# Patient Record
Sex: MALE | Race: BLACK OR AFRICAN AMERICAN | Employment: STUDENT | URBAN - METROPOLITAN AREA
[De-identification: names, ages, dates, MRNs, and addresses within clinical notes are randomized per-mention and may not be internally consistent; named-entity substitution may affect disease eponyms.]

---

## 2018-02-05 ENCOUNTER — TELEPHONE (OUTPATIENT)
Dept: FAMILY MEDICINE | Facility: CLINIC | Age: 23
End: 2018-02-05

## 2018-02-05 ENCOUNTER — OFFICE VISIT (OUTPATIENT)
Dept: FAMILY MEDICINE | Facility: CLINIC | Age: 23
End: 2018-02-05
Payer: COMMERCIAL

## 2018-02-05 DIAGNOSIS — L70.0 ACNE VULGARIS: Primary | ICD-10-CM

## 2018-02-05 DIAGNOSIS — Z79.899 ENCOUNTER FOR LONG-TERM (CURRENT) USE OF HIGH-RISK MEDICATION: ICD-10-CM

## 2018-02-05 DIAGNOSIS — Z79.899 LONG TERM USE OF ISOTRETINOIN: ICD-10-CM

## 2018-02-05 LAB
AST SERPL W P-5'-P-CCNC: 27 U/L (ref 0–45)
BASOPHILS # BLD AUTO: 0 10E9/L (ref 0–0.2)
BASOPHILS NFR BLD AUTO: 0.3 %
CHOLEST SERPL-MCNC: 157 MG/DL
DIFFERENTIAL METHOD BLD: NORMAL
EOSINOPHIL # BLD AUTO: 0.1 10E9/L (ref 0–0.7)
EOSINOPHIL NFR BLD AUTO: 1.4 %
ERYTHROCYTE [DISTWIDTH] IN BLOOD BY AUTOMATED COUNT: 12.1 % (ref 10–15)
HCT VFR BLD AUTO: 42.5 % (ref 40–53)
HDLC SERPL-MCNC: 62 MG/DL
HGB BLD-MCNC: 14.4 G/DL (ref 13.3–17.7)
LDLC SERPL CALC-MCNC: 81 MG/DL
LYMPHOCYTES # BLD AUTO: 2.5 10E9/L (ref 0.8–5.3)
LYMPHOCYTES NFR BLD AUTO: 35.4 %
MCH RBC QN AUTO: 29.6 PG (ref 26.5–33)
MCHC RBC AUTO-ENTMCNC: 33.9 G/DL (ref 31.5–36.5)
MCV RBC AUTO: 87 FL (ref 78–100)
MONOCYTES # BLD AUTO: 0.8 10E9/L (ref 0–1.3)
MONOCYTES NFR BLD AUTO: 11 %
NEUTROPHILS # BLD AUTO: 3.7 10E9/L (ref 1.6–8.3)
NEUTROPHILS NFR BLD AUTO: 51.9 %
NONHDLC SERPL-MCNC: 95 MG/DL
PLATELET # BLD AUTO: 163 10E9/L (ref 150–450)
RBC # BLD AUTO: 4.86 10E12/L (ref 4.4–5.9)
TRIGL SERPL-MCNC: 72 MG/DL
WBC # BLD AUTO: 7 10E9/L (ref 4–11)

## 2018-02-05 PROCEDURE — 80061 LIPID PANEL: CPT | Performed by: FAMILY MEDICINE

## 2018-02-05 PROCEDURE — 99214 OFFICE O/P EST MOD 30 MIN: CPT | Performed by: FAMILY MEDICINE

## 2018-02-05 PROCEDURE — 84450 TRANSFERASE (AST) (SGOT): CPT | Performed by: FAMILY MEDICINE

## 2018-02-05 PROCEDURE — 85025 COMPLETE CBC W/AUTO DIFF WBC: CPT | Performed by: FAMILY MEDICINE

## 2018-02-05 PROCEDURE — 36415 COLL VENOUS BLD VENIPUNCTURE: CPT | Performed by: FAMILY MEDICINE

## 2018-02-05 RX ORDER — ISOTRETINOIN 20 MG/1
20 CAPSULE ORAL 2 TIMES DAILY
Qty: 60 CAPSULE | Refills: 0 | Status: SHIPPED | OUTPATIENT
Start: 2018-02-05 | End: 2018-03-07

## 2018-02-05 NOTE — PROGRESS NOTES
[facility] - PRIMARY CARE SKIN    CC : Acne   SUBJECTIVE:                                                    Juan C Allen is a 22 year old male who presents to clinic today to start his first month of oral isotretinoin therapy for severe, recalcitrant acne.     he has tried the following without success : oral antibiotics, benzyl peroxide, topical tretinoins, topical antibiotics and OTC.  Oral isotretinoin has previously been very effective for control of acne.    Family history of acne : YES - brother's acne has been effectively controlled with oral isotretinoin.    Symptoms have been ongoing for : years.  The acne is primarily located on the : neck, back, chest and face.  Acne generally presents as : pimple(s).    Current treatment : Water only for cleansing.  Response to treatment : Acne control has diminished since last oral isotretinoin course. It has begun to recur over the last 1 year. He was unable to return to clinic because of a loss of insurance.  Side effects noted : None.    Previous treatments include :   Oral isotretinoin (however, courses of oral isotretinoin with 7-8 week gaps in patient follow-up have been discontinued due to patient non-compliance with follow-up). He previously had insurance issues.    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    Refer to electronic medical record (EMR) for past medical history and medications.    INTEGUMENTARY/SKIN: POSITIVE for acne  ROS : 14 point review of systems was negative except the symptoms listed above in the HPI.    This document serves as a record of the services and decisions personally performed and made by Glo Montoya MD. It was created on her behalf by Nico Alfred, a trained medical scribe.  The creation of this document is based on the scribe's personal observations and the provider's statements to the medical scribe.  Nico Alfred, February 5, 2018 8:17 AM      OBJECTIVE:                                                       Wt Readings from Last 2 Encounters:   06/25/16 135 lb (61.2 kg)   11/20/15 119 lb (54 kg) (3 %)*     * Growth percentiles are based on CDC 2-20 Years data.     GENERAL: healthy, alert and no distress  SKIN: Soto Skin Type - V.  Face, Neck and Trunk were examined. The dermatoscope was used to help evaluate pigmented lesions.  Skin Pertinent Findings:  Face: Residual post-inflammatory hyperpigmentation; scattered inflammatory papules on neck and lateral sides of face.    Chest: Scattered inflammatory papules.    Back: Residual post-inflammatory hyperpigmentation.    Diagnostic Test Results:  No results found for this or any previous visit (from the past 24 hour(s)).    MDM : . Discussed pathophysiology of acne, treatment options, and expectations for treatment response.  Side effects of oral isotretinoin reviewed : dryness of the skin and mucous membranes, arthralgias, myalgias, mood changes. Discussed potential flare of the acne.      ASSESSMENT:                                                      Encounter Diagnoses   Name Primary?     Acne vulgaris Yes     Long term use of isotretinoin      Encounter for long-term (current) use of high-risk medication      Comment : severe acne, recalcitrant to previous treatments. Oral isotretinoin to be started with monthly monitoring.      PLAN:                                                      Patient Instructions   FUTURE APPOINTMENTS  Follow up in 28-31 days.    ORAL ISOTRETINOIN INSTRUCTIONS  CURRENT DOSAGE: Take by mouth one 20 mg tablet, two times a day.      Stop all other acne cleansers or creams.    Take the isotretinoin with a fatty meal (such as peanut butter or yogurt) to improve the absorption of the medication.    OFFICE VISIT INSTRUCTIONS    Schedule follow-up appointments every 28-31 days.    Bring iPledge ID# and PASSWORD with you to each office visit. Make sure you have obtained your password before the next office visit.    You will  "need to do a lab blood draw every month:    Schedule blood draw to be completed 1-2 days prior to each office visit  You may complete these at any [facility] lab; just remember to schedule it before you go.      If you are being seen elsewhere by a dermatologist for oral isotretinoin monitoring, be sure to go into iPledge for \"transfer of care\" for the appropriate physician.    Make sure to always  your medication within 3 days of prescription being sent to the pharmacy    Check with your insurance company for coverage of oral isotretinoin therapy for recalcitrant acne. Ask if they have a preferred brand of oral isotretinoin (e.g. Claravis, Myorisan, Zenatane, Amnesteem, Sotret)    RECOMMENDATIONS FOR DRYNESS    Moisturizer : Cetaphil facial moisturizer.    Nasal mist spray : Ocean brand. Use at bedtime.    Do not use clarita-synephrine.    Lips : Aquaphor ointment, Vaseline jelly, or Vanicream lip protectant for the dryness on the lips.    Eye drops : Refresh tears saline eye drops for dry eye symptoms. Consider also use of gel eye drops at bedtime if excessive eye dryness.    Due to dryness of mouth, floss daily and brush teeth at least twice daily.    Discontinue all other acne medications.    Initial lab studies were ordered.    Oral isotretinoin is discussed fully with the patient.    It is a very effective drug to treat acne vulgaris but has many significant side effects. Chief among these are teratogenesis, hepatic injury, dyslipidemia and severe drying of the mucous membranes. All of these issues have been discussed in detail. Monthly blood tests to monitor lipids and liver functions will be necessary. Expect painful dryness and/or fissuring around the lips, eyes, and other moist areas of the body. Balms may be protective. Contact lenses may be too painful to wear temporarily while on this drug. Episodes of significant depression have been reported, including suicidal ideation and attempts in rare " cases. It may also cause pseudotumor cerebri and hyperostosis. The patient will report any such changes in mood, depressive symptoms or suicidal thoughts, headaches, joint or bone pains.    Female patients MUST use two simultaneous methods of family planning. Accutane is Category X for pregnancy, meaning it will cause fetal teratogenic malformations, and pregnancy MUST be avoided while on this drug. For that reason, the patient is admonished to never share the medication.    The dose is 0.5-1 mg/kg in two divided doses for 15-20 weeks.    After discussion of these important issues, he indicates complete understanding of all of the above, and Does wish to proceed with accutane therapy. A signed consent was obtained. Discussed the importance of sticking with the program, not picking or excoriating.    Dose:  Month # : 1.  Oral Isotretinoin : 20 mg po BID.  Insurance preferred brand : ?None.    RTC in one month for follow up, or sooner prn, with laboratory studies completed.      PROCEDURES:                                                    None.    TT : 20 minutes.  CT : 15 minutes, with 50% of time spent reviewing lab work and counseling patient about side effects, benefits and risks of oral isotretinoin.      The information in this document, created by the medical scribe for me, accurately reflects the services I personally performed and the decisions made by me. I have reviewed and approved this document for accuracy prior to leaving the patient care area.  Glo Montoya MD February 5, 2018 8:35 AM  [facility]

## 2018-02-05 NOTE — MR AVS SNAPSHOT
"              After Visit Summary   2/5/2018    Juan C Allen    MRN: [id number]           Patient Information     Date Of Birth          1995        Visit Information        Provider Department      2/5/2018 8:20 AM Betsy Montoya MD [facility]        Today's Diagnoses     Acne vulgaris    -  1    Long term use of isotretinoin        Encounter for long-term (current) use of high-risk medication          Care Instructions    FUTURE APPOINTMENTS  Follow up in 28-31 days.    ORAL ISOTRETINOIN INSTRUCTIONS  CURRENT DOSAGE: Take by mouth one 20 mg tablet, two times a day.      Stop all other acne cleansers or creams.    Take the isotretinoin with a fatty meal (such as peanut butter or yogurt) to improve the absorption of the medication.    OFFICE VISIT INSTRUCTIONS    Schedule follow-up appointments every 28-31 days.    Bring iPledge ID# and PASSWORD with you to each office visit. Make sure you have obtained your password before the next office visit.    You will need to do a lab blood draw every month:    Schedule blood draw to be completed 1-2 days prior to each office visit  You may complete these at any [facility] lab; just remember to schedule it before you go.      If you are being seen elsewhere by a dermatologist for oral isotretinoin monitoring, be sure to go into iPledge for \"transfer of care\" for the appropriate physician.    Make sure to always  your medication within 3 days of prescription being sent to the pharmacy    Check with your insurance company for coverage of oral isotretinoin therapy for recalcitrant acne. Ask if they have a preferred brand of oral isotretinoin (e.g. Claravis, Myorisan, Zenatane, Amnesteem, Sotret)    RECOMMENDATIONS FOR DRYNESS    Moisturizer : Cetaphil facial moisturizer.    Nasal mist spray : Ocean brand. Use at bedtime.    Do not use clarita-synephrine.    Lips : Aquaphor ointment, Vaseline jelly, or Vanicream lip protectant for the " "dryness on the lips.    Eye drops : Refresh tears saline eye drops for dry eye symptoms. Consider also use of gel eye drops at bedtime if excessive eye dryness.    Due to dryness of mouth, floss daily and brush teeth at least twice daily.          Follow-ups after your visit        Who to contact     If you have questions or need follow up information about today's clinic visit or your schedule please contact [facility] [place] directly at [phone].  Normal or non-critical lab and imaging results will be communicated to you by pycohart, letter or phone within 4 business days after the clinic has received the results. If you do not hear from us within 7 days, please contact the clinic through pycohart or phone. If you have a critical or abnormal lab result, we will notify you by phone as soon as possible.  Submit refill requests through RETAIL PRO or call your pharmacy and they will forward the refill request to us. Please allow 3 business days for your refill to be completed.          Additional Information About Your Visit        RETAIL PRO Information     RETAIL PRO lets you send messages to your doctor, view your test results, renew your prescriptions, schedule appointments and more. To sign up, go to [email or web address] . Click on \"Log in\" on the left side of the screen, which will take you to the Welcome page. Then click on \"Sign up Now\" on the right side of the page.     You will be asked to enter the access code listed below, as well as some personal information. Please follow the directions to create your username and password.     Your access code is: [email or web address]  Expires: 2018  8:34 AM     Your access code will  in 90 days. If you need help or a new code, please call your [facility] clinic or [phone].        Care EveryWhere ID     This is your Care EveryWhere ID. This could be used by other organizations to access your [facility] medical records  [id number]         Blood Pressure from " Last 3 Encounters:   06/25/16 123/81   11/20/15 112/74   08/17/14 126/86    Weight from Last 3 Encounters:   06/25/16 135 lb (61.2 kg)   11/20/15 119 lb (54 kg) (3 %)*   08/17/14 149 lb (67.6 kg) (49 %)*     * Growth percentiles are based on [facility] 2-20 Years data.              Today, you had the following     No orders found for display       Primary Care Provider Fax #    Provider Not In System [phone]                Equal Access to Services     [facility] : Hadii angelina beck Sothierry, waaxda luqadaha, qaybta kaalmada siria, tiffanie green . [facility] [phone].    ATENCIÓN: Si habla español, tiene a albarran disposición servicios gratuitos de asistencia lingüística. Llame al [phone].    We comply with applicable federal civil rights laws and [place] laws. We do not discriminate on the basis of race, color, national origin, age, disability, sex, sexual orientation, or gender identity.            Thank you!     [facility]. Our goal is always to provide you with excellent care. Hearing back from our patients is one way we can continue to improve our services. Please take a few minutes to complete the written survey that you may receive in the mail after your visit with us. Thank you!             Your Updated Medication List - Protect others around you: Learn how to safely use, store and throw away your medicines at www.disposemymeds.org.          This list is accurate as of 2/5/18  8:48 AM.  Always use your most recent med list.                   Brand Name Dispense Instructions for use Diagnosis    clindamycin 1 % topical gel    CLINDAMAX    30 g    Apply topically 2 times daily    Acne vulgaris       ISOtretinoin 20 MG capsule    ACCUTANE    60 capsule    Take 1 capsule (20 mg) by mouth 2 times daily    Acne vulgaris, Encounter for long-term (current) use of high-risk medication, Long term use of isotretinoin, History  of isotretinoin therapy

## 2018-02-05 NOTE — PATIENT INSTRUCTIONS
"FUTURE APPOINTMENTS  Follow up in 28-31 days.    ORAL ISOTRETINOIN INSTRUCTIONS  CURRENT DOSAGE: Take by mouth one 20 mg tablet, two times a day.      Stop all other acne cleansers or creams.    Take the isotretinoin with a fatty meal (such as peanut butter or yogurt) to improve the absorption of the medication.    OFFICE VISIT INSTRUCTIONS    Schedule follow-up appointments every 28-31 days.    Bring iPledge ID# and PASSWORD with you to each office visit. Make sure you have obtained your password before the next office visit.    You will need to do a lab blood draw every month:    Schedule blood draw to be completed 1-2 days prior to each office visit  You may complete these at any [facility] lab; just remember to schedule it before you go.      If you are being seen elsewhere by a dermatologist for oral isotretinoin monitoring, be sure to go into iPledge for \"transfer of care\" for the appropriate physician.    Make sure to always  your medication within 3 days of prescription being sent to the pharmacy    Check with your insurance company for coverage of oral isotretinoin therapy for recalcitrant acne. Ask if they have a preferred brand of oral isotretinoin (e.g. Claravis, Myorisan, Zenatane, Amnesteem, Sotret)    RECOMMENDATIONS FOR DRYNESS    Moisturizer : Cetaphil facial moisturizer.    Nasal mist spray : Ocean brand. Use at bedtime.    Do not use clarita-synephrine.    Lips : Aquaphor ointment, Vaseline jelly, or Vanicream lip protectant for the dryness on the lips.    Eye drops : Refresh tears saline eye drops for dry eye symptoms. Consider also use of gel eye drops at bedtime if excessive eye dryness.    Due to dryness of mouth, floss daily and brush teeth at least twice daily.  "

## 2018-02-05 NOTE — LETTER
2/5/2018         RE: Juan C Allen  [address] [place] [address]        Dear Colleague,    Thank you for referring your patient, Juan C Allen, to the [facility] [place]. Please see a copy of my visit note below.    [facility] - PRIMARY CARE SKIN    CC : Acne   SUBJECTIVE:                                                    Juan C Allen is a 22 year old male who presents to clinic today to start his first month of oral isotretinoin therapy for severe, recalcitrant acne.     he has tried the following without success : oral antibiotics, benzyl peroxide, topical tretinoins, topical antibiotics and OTC.  Oral isotretinoin has previously been very effective for control of acne.    Family history of acne : YES - brother's acne has been effectively controlled with oral isotretinoin.    Symptoms have been ongoing for : years.  The acne is primarily located on the : neck, back, chest and face.  Acne generally presents as : pimple(s).    Current treatment : Water only for cleansing.  Response to treatment : Acne control has diminished since last oral isotretinoin course. It has begun to recur over the last 1 year. He was unable to return to clinic because of a loss of insurance.  Side effects noted : None.    Previous treatments include :   Oral isotretinoin (however, courses of oral isotretinoin with 7-8 week gaps in patient follow-up have been discontinued due to patient non-compliance with follow-up). He previously had insurance issues.    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    Refer to electronic medical record (EMR) for past medical history and medications.    INTEGUMENTARY/SKIN: POSITIVE for acne  ROS : 14 point review of systems was negative except the symptoms listed above in the HPI.    This document serves as a record of the services and decisions personally performed and made by Glo Montoya MD. It was created on her behalf by Nico Alfred, a trained  medical scribe.  The creation of this document is based on the scribe's personal observations and the provider's statements to the medical scribe.  Nico Alfred, February 5, 2018 8:17 AM      OBJECTIVE:                                                      Wt Readings from Last 2 Encounters:   06/25/16 135 lb (61.2 kg)   11/20/15 119 lb (54 kg) (3 %)*     * Growth percentiles are based on [facility] 2-20 Years data.     GENERAL: healthy, alert and no distress  SKIN: Soto Skin Type - V.  Face, Neck and Trunk were examined. The dermatoscope was used to help evaluate pigmented lesions.  Skin Pertinent Findings:  Face: Residual post-inflammatory hyperpigmentation; scattered inflammatory papules on neck and lateral sides of face.    Chest: Scattered inflammatory papules.    Back: Residual post-inflammatory hyperpigmentation.    Diagnostic Test Results:  No results found for this or any previous visit (from the past 24 hour(s)).    MDM : . Discussed pathophysiology of acne, treatment options, and expectations for treatment response.  Side effects of oral isotretinoin reviewed : dryness of the skin and mucous membranes, arthralgias, myalgias, mood changes. Discussed potential flare of the acne.      ASSESSMENT:                                                      Encounter Diagnoses   Name Primary?     Acne vulgaris Yes     Long term use of isotretinoin      Encounter for long-term (current) use of high-risk medication      Comment : severe acne, recalcitrant to previous treatments. Oral isotretinoin to be started with monthly monitoring.      PLAN:                                                      Patient Instructions   FUTURE APPOINTMENTS  Follow up in 28-31 days.    ORAL ISOTRETINOIN INSTRUCTIONS  CURRENT DOSAGE: Take by mouth one 20 mg tablet, two times a day.      Stop all other acne cleansers or creams.    Take the isotretinoin with a fatty meal (such as peanut butter or yogurt) to improve the absorption of the  "medication.    OFFICE VISIT INSTRUCTIONS    Schedule follow-up appointments every 28-31 days.    Bring iPledge ID# and PASSWORD with you to each office visit. Make sure you have obtained your password before the next office visit.    You will need to do a lab blood draw every month:    Schedule blood draw to be completed 1-2 days prior to each office visit  You may complete these at any [facility] lab; just remember to schedule it before you go.      If you are being seen elsewhere by a dermatologist for oral isotretinoin monitoring, be sure to go into iPledge for \"transfer of care\" for the appropriate physician.    Make sure to always  your medication within 3 days of prescription being sent to the pharmacy    Check with your insurance company for coverage of oral isotretinoin therapy for recalcitrant acne. Ask if they have a preferred brand of oral isotretinoin (e.g. Claravis, Myorisan, Zenatane, Amnesteem, Sotret)    RECOMMENDATIONS FOR DRYNESS    Moisturizer : Cetaphil facial moisturizer.    Nasal mist spray : Ocean brand. Use at bedtime.    Do not use clarita-synephrine.    Lips : Aquaphor ointment, Vaseline jelly, or Vanicream lip protectant for the dryness on the lips.    Eye drops : Refresh tears saline eye drops for dry eye symptoms. Consider also use of gel eye drops at bedtime if excessive eye dryness.    Due to dryness of mouth, floss daily and brush teeth at least twice daily.    Discontinue all other acne medications.    Initial lab studies were ordered.    Oral isotretinoin is discussed fully with the patient.    It is a very effective drug to treat acne vulgaris but has many significant side effects. Chief among these are teratogenesis, hepatic injury, dyslipidemia and severe drying of the mucous membranes. All of these issues have been discussed in detail. Monthly blood tests to monitor lipids and liver functions will be necessary. Expect painful dryness and/or fissuring around the lips, " eyes, and other moist areas of the body. Balms may be protective. Contact lenses may be too painful to wear temporarily while on this drug. Episodes of significant depression have been reported, including suicidal ideation and attempts in rare cases. It may also cause pseudotumor cerebri and hyperostosis. The patient will report any such changes in mood, depressive symptoms or suicidal thoughts, headaches, joint or bone pains.    Female patients MUST use two simultaneous methods of family planning. Accutane is Category X for pregnancy, meaning it will cause fetal teratogenic malformations, and pregnancy MUST be avoided while on this drug. For that reason, the patient is admonished to never share the medication.    The dose is 0.5-1 mg/kg in two divided doses for 15-20 weeks.    After discussion of these important issues, he indicates complete understanding of all of the above, and Does wish to proceed with accutane therapy. A signed consent was obtained. Discussed the importance of sticking with the program, not picking or excoriating.    Dose:  Month # : 1.  Oral Isotretinoin : 20 mg po BID.  Insurance preferred brand : ?None.    RTC in one month for follow up, or sooner prn, with laboratory studies completed.      PROCEDURES:                                                    None.    TT : 20 minutes.  CT : 15 minutes, with 50% of time spent reviewing lab work and counseling patient about side effects, benefits and risks of oral isotretinoin.      The information in this document, created by the medical scribe for me, accurately reflects the services I personally performed and the decisions made by me. I have reviewed and approved this document for accuracy prior to leaving the patient care area.  Glo Montoya MD February 5, 2018 8:35 AM  [facility], thank you for allowing me to participate in the care of your patient.        Sincerely,        Betsy Montoya MD

## 2018-02-05 NOTE — TELEPHONE ENCOUNTER
Notes Recorded by Betsy Montoya MD on 2/5/2018 at 4:37 PM  Call and let him know that the lab work looks good. Thanks,miranda    Left message on answering machine for patient to call back.    Reina BROWN RN  [facility]  [phone]  [facility]   [phone]

## 2018-02-12 NOTE — TELEPHONE ENCOUNTER
Left message on answering machine for patient that lab work was good.  Reina BROWN RN  [facility]  [phone]  [facility]   [phone]

## 2018-03-05 ENCOUNTER — OFFICE VISIT (OUTPATIENT)
Dept: FAMILY MEDICINE | Facility: CLINIC | Age: 23
End: 2018-03-05
Payer: COMMERCIAL

## 2018-03-05 DIAGNOSIS — Z79.899 ENCOUNTER FOR LONG-TERM (CURRENT) USE OF HIGH-RISK MEDICATION: ICD-10-CM

## 2018-03-05 DIAGNOSIS — Z79.899 LONG TERM USE OF ISOTRETINOIN: ICD-10-CM

## 2018-03-05 DIAGNOSIS — L70.0 ACNE VULGARIS: Primary | ICD-10-CM

## 2018-03-05 LAB
AST SERPL W P-5'-P-CCNC: 27 U/L (ref 0–45)
BASOPHILS # BLD AUTO: 0 10E9/L (ref 0–0.2)
BASOPHILS NFR BLD AUTO: 0.4 %
CHOLEST SERPL-MCNC: 162 MG/DL
DIFFERENTIAL METHOD BLD: ABNORMAL
EOSINOPHIL # BLD AUTO: 0.1 10E9/L (ref 0–0.7)
EOSINOPHIL NFR BLD AUTO: 1.7 %
ERYTHROCYTE [DISTWIDTH] IN BLOOD BY AUTOMATED COUNT: 11.9 % (ref 10–15)
HCT VFR BLD AUTO: 39.6 % (ref 40–53)
HDLC SERPL-MCNC: 59 MG/DL
HGB BLD-MCNC: 13.4 G/DL (ref 13.3–17.7)
LDLC SERPL CALC-MCNC: 89 MG/DL
LYMPHOCYTES # BLD AUTO: 3.2 10E9/L (ref 0.8–5.3)
LYMPHOCYTES NFR BLD AUTO: 45.8 %
MCH RBC QN AUTO: 29.8 PG (ref 26.5–33)
MCHC RBC AUTO-ENTMCNC: 33.8 G/DL (ref 31.5–36.5)
MCV RBC AUTO: 88 FL (ref 78–100)
MONOCYTES # BLD AUTO: 0.9 10E9/L (ref 0–1.3)
MONOCYTES NFR BLD AUTO: 12.3 %
NEUTROPHILS # BLD AUTO: 2.7 10E9/L (ref 1.6–8.3)
NEUTROPHILS NFR BLD AUTO: 39.8 %
NONHDLC SERPL-MCNC: 103 MG/DL
PLATELET # BLD AUTO: 156 10E9/L (ref 150–450)
RBC # BLD AUTO: 4.5 10E12/L (ref 4.4–5.9)
TRIGL SERPL-MCNC: 70 MG/DL
WBC # BLD AUTO: 6.9 10E9/L (ref 4–11)

## 2018-03-05 PROCEDURE — 85025 COMPLETE CBC W/AUTO DIFF WBC: CPT | Performed by: FAMILY MEDICINE

## 2018-03-05 PROCEDURE — 80061 LIPID PANEL: CPT | Performed by: FAMILY MEDICINE

## 2018-03-05 PROCEDURE — 99213 OFFICE O/P EST LOW 20 MIN: CPT | Performed by: FAMILY MEDICINE

## 2018-03-05 PROCEDURE — 84450 TRANSFERASE (AST) (SGOT): CPT | Performed by: FAMILY MEDICINE

## 2018-03-05 PROCEDURE — 36415 COLL VENOUS BLD VENIPUNCTURE: CPT | Performed by: FAMILY MEDICINE

## 2018-03-05 RX ORDER — ISOTRETINOIN 30 MG/1
30 CAPSULE ORAL 2 TIMES DAILY
Qty: 60 CAPSULE | Refills: 0 | Status: SHIPPED | OUTPATIENT
Start: 2018-03-05 | End: 2018-05-23

## 2018-03-05 NOTE — LETTER
3/5/2018         RE: Juan C Allen  [address]  [place] [address]        Dear Colleague,    Thank you for referring your patient, Juan C Allen, to the [facility] [place]. Please see a copy of my visit note below.    [facility] - PRIMARY CARE SKIN    CC : Acne and Oral Isotretinoin Follow-up   SUBJECTIVE:                                                    Juan C Allen is a 22 year old male who presents to clinic today for follow-up of oral isotretinoin therapy for severe, recalcitrant acne.    Months completed : 1  Current dosage : 20 mg BID.  Treatment response : Cystic acne breakouts have continued, with one very prominent near the right eye causing swelling.  Side effects noted : Raw and dry skin on the lips. He has not used any moisturizer on the lips.    PHQ-2 Score:   PHQ-2 ( 1999 Pfizer) 3/5/2018 11/20/2015   Q1: Little interest or pleasure in doing things 0 0   Q2: Feeling down, depressed or hopeless 0 0   PHQ-2 Score 0 0     Previous treatments include :   Oral isotretinoin (however, courses of oral isotretinoin with 7-8 week gaps in patient follow-up have been discontinued due to patient non-compliance with follow-up). He previously had insurance issues.     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     Refer to electronic medical record (EMR) for past medical history and medications.    INTEGUMENTARY/SKIN: POSITIVE for acne  ROS : 14 point review of systems was negative except the symptoms listed above in the HPI.    This document serves as a record of the services and decisions personally performed and made by Glo Montoya MD. It was created on her behalf by Nico Alfred, a trained medical scribe.  The creation of this document is based on the scribe's personal observations and the provider's statements to the medical scribe.  Nico Alfred, March 5, 2018 8:05 AM      OBJECTIVE:                                                      Wt Readings from  "Last 2 Encounters:   06/25/16 135 lb (61.2 kg)   11/20/15 119 lb (54 kg) (3 %)*     * Growth percentiles are based on CDC 2-20 Years data.     GENERAL: healthy, alert and no distress  SKIN: Soto Skin Type - V.  Face, Neck and Trunk were examined. The dermatoscope was used to help evaluate pigmented lesions.  Skin Pertinent Findings:  Face: Resolving nodule on right eyebrow.    Diagnostic Test Results:  No results found for this or any previous visit (from the past 24 hour(s)).          ASSESSMENT:                                                      Encounter Diagnoses   Name Primary?     Acne vulgaris Yes     Long term use of isotretinoin      Encounter for long-term (current) use of high-risk medication      Comment : severe acne, oral isotretinoin treatment with monthly monitoring.      PLAN:                                                    Patient Instructions   FUTURE APPOINTMENTS  Follow up in 28-31 days.    ORAL ISOTRETINOIN INSTRUCTIONS  CURRENT DOSAGE: Take by mouth one 30 mg tablet, two times a day.      Stop all other acne cleansers or creams.    Take the isotretinoin with a fatty meal (such as peanut butter or yogurt) to improve the absorption of the medication.    OFFICE VISIT INSTRUCTIONS    Schedule follow-up appointments every 28-31 days.    Bring iPledge ID# and PASSWORD with you to each office visit. Make sure you have obtained your password before the next office visit.    You will need to do a lab blood draw every month:    Schedule blood draw to be completed 1-2 days prior to each office visit  You may complete these at any [facility] lab; just remember to schedule it before you go.      If you are being seen elsewhere by a dermatologist for oral isotretinoin monitoring, be sure to go into iPledge for \"transfer of care\" for the appropriate physician.    Make sure to always  your medication within 3 days of prescription being sent to the pharmacy    Check with your insurance " company for coverage of oral isotretinoin therapy for recalcitrant acne. Ask if they have a preferred brand of oral isotretinoin (e.g. Claravis, Myorisan, Zenatane, Amnesteem, Sotret)    RECOMMENDATIONS FOR DRYNESS    Moisturizer : Cetaphil facial moisturizer.    Nasal mist spray : Ocean brand. Use at bedtime.    Do not use clarita-synephrine.    Lips : Aquaphor ointment, Vaseline jelly, or Vanicream lip protectant for the dryness on the lips.    Eye drops : Refresh tears saline eye drops for dry eye symptoms. Consider also use of gel eye drops at bedtime if excessive eye dryness.    Due to dryness of mouth, floss daily and brush teeth at least twice daily.      RTC in one month for follow up, or sooner prn, with laboratory studies completed.    Oral isotretinoin is again discussed fully with the patient.    It is a very effective drug to treat acne vulgaris but has many significant side effects. Chief among these are teratogenesis, hepatic injury, dyslipidemia and severe drying of the mucous membranes. All of these issues have been discussed in detail. Monthly blood tests to monitor lipids and liver functions will be necessary. Expect painful dryness and/or fissuring around the lips, eyes, and other moist areas of the body. Balms may be protective. Contact lenses may be too painful to wear temporarily while on this drug. Episodes of significant depression have been reported, including suicidal ideation and attempts in rare cases. It may also cause pseudotumor cerebri and hyperostosis. The patient will report any such changes in mood, depressive symptoms or suicidal thoughts, headaches, joint or bone pains.    Female patients MUST use two simultaneous methods of family planning. Accutane is Category X for pregnancy, meaning it will cause fetal teratogenic malformations, and pregnancy MUST be avoided while on this drug. For that reason, the patient is admonished to never share the medication.    The dose is 0.5-1 mg/kg  in two divided doses daily for 15-20 weeks.    After discussion of these important issues, he indicates complete understanding of all of the above, and Does wish to proceed with accutane therapy. Discussed the importance of sticking with the program, not picking or excoriating.    Dose:  Month # : 2.  Oral Isotretinoin : 30 mg po BID.  Change from previous dose : Yes - increase.  Insurance preferred brand : None.    Oral isotretinoin dosing:  Month #1 (prescribed on 2/5/2018) : 20 mg po BID x 30 days = 1200 mg.    Total dose to date : 1200 mg      PROCEDURES:                                                    None.    TT : 20 minutes.  CT : 15 minutes, with 50% of time spent reviewing lab work and counseling patient about side effects, benefits and risks of oral isotretinoin.      The information in this document, created by the medical scribe for me, accurately reflects the services I personally performed and the decisions made by me. I have reviewed and approved this document for accuracy prior to leaving the patient care area.  Glo Montoya MD March 5, 2018 8:05 AM  [facility]    Again, thank you for allowing me to participate in the care of your patient.        Sincerely,        Betsy Montoya MD

## 2018-03-05 NOTE — PROGRESS NOTES
[facility] - PRIMARY CARE SKIN    CC : Acne and Oral Isotretinoin Follow-up   SUBJECTIVE:                                                    Juan C Allen is a 22 year old male who presents to clinic today for follow-up of oral isotretinoin therapy for severe, recalcitrant acne.    Months completed : 1  Current dosage : 20 mg BID.  Treatment response : Cystic acne breakouts have continued, with one very prominent near the right eye causing swelling.  Side effects noted : Raw and dry skin on the lips. He has not used any moisturizer on the lips.    PHQ-2 Score:   PHQ-2 ( 1999 Pfizer) 3/5/2018 11/20/2015   Q1: Little interest or pleasure in doing things 0 0   Q2: Feeling down, depressed or hopeless 0 0   PHQ-2 Score 0 0     Previous treatments include :   Oral isotretinoin (however, courses of oral isotretinoin with 7-8 week gaps in patient follow-up have been discontinued due to patient non-compliance with follow-up). He previously had insurance issues.     [employer] #: [id number]     Occupation : working at [employer] ([employer]).     Refer to electronic medical record (EMR) for past medical history and medications.    INTEGUMENTARY/SKIN: POSITIVE for acne  ROS : 14 point review of systems was negative except the symptoms listed above in the HPI.    This document serves as a record of the services and decisions personally performed and made by Glo Montoya MD. It was created on her behalf by Nico Alfred, a trained medical scribe.  The creation of this document is based on the scribe's personal observations and the provider's statements to the medical scribe.  Nico Alfred, March 5, 2018 8:05 AM      OBJECTIVE:                                                      Wt Readings from Last 2 Encounters:   06/25/16 135 lb (61.2 kg)   11/20/15 119 lb (54 kg) (3 %)*     * Growth percentiles are based on CDC 2-20 Years data.     GENERAL: healthy, alert and no distress  SKIN: Soto Skin Type - V.  Face, Neck and Trunk  "were examined. The dermatoscope was used to help evaluate pigmented lesions.  Skin Pertinent Findings:  Face: Resolving nodule on right eyebrow.    Diagnostic Test Results:  No results found for this or any previous visit (from the past 24 hour(s)).          ASSESSMENT:                                                      Encounter Diagnoses   Name Primary?     Acne vulgaris Yes     Long term use of isotretinoin      Encounter for long-term (current) use of high-risk medication      Comment : severe acne, oral isotretinoin treatment with monthly monitoring.      PLAN:                                                    Patient Instructions   FUTURE APPOINTMENTS  Follow up in 28-31 days.    ORAL ISOTRETINOIN INSTRUCTIONS  CURRENT DOSAGE: Take by mouth one 30 mg tablet, two times a day.      Stop all other acne cleansers or creams.    Take the isotretinoin with a fatty meal (such as peanut butter or yogurt) to improve the absorption of the medication.    OFFICE VISIT INSTRUCTIONS    Schedule follow-up appointments every 28-31 days.    Bring iPledge ID# and PASSWORD with you to each office visit. Make sure you have obtained your password before the next office visit.    You will need to do a lab blood draw every month:    Schedule blood draw to be completed 1-2 days prior to each office visit  You may complete these at any [facility] lab; just remember to schedule it before you go.      If you are being seen elsewhere by a dermatologist for oral isotretinoin monitoring, be sure to go into iPledge for \"transfer of care\" for the appropriate physician.    Make sure to always  your medication within 3 days of prescription being sent to the pharmacy    Check with your insurance company for coverage of oral isotretinoin therapy for recalcitrant acne. Ask if they have a preferred brand of oral isotretinoin (e.g. Claravis, Myorisan, Zenatane, Amnesteem, Sotret)    RECOMMENDATIONS FOR DRYNESS    Moisturizer : " Cetaphil facial moisturizer.    Nasal mist spray : Ocean brand. Use at bedtime.    Do not use clarita-synephrine.    Lips : Aquaphor ointment, Vaseline jelly, or Vanicream lip protectant for the dryness on the lips.    Eye drops : Refresh tears saline eye drops for dry eye symptoms. Consider also use of gel eye drops at bedtime if excessive eye dryness.    Due to dryness of mouth, floss daily and brush teeth at least twice daily.      RTC in one month for follow up, or sooner prn, with laboratory studies completed.    Oral isotretinoin is again discussed fully with the patient.    It is a very effective drug to treat acne vulgaris but has many significant side effects. Chief among these are teratogenesis, hepatic injury, dyslipidemia and severe drying of the mucous membranes. All of these issues have been discussed in detail. Monthly blood tests to monitor lipids and liver functions will be necessary. Expect painful dryness and/or fissuring around the lips, eyes, and other moist areas of the body. Balms may be protective. Contact lenses may be too painful to wear temporarily while on this drug. Episodes of significant depression have been reported, including suicidal ideation and attempts in rare cases. It may also cause pseudotumor cerebri and hyperostosis. The patient will report any such changes in mood, depressive symptoms or suicidal thoughts, headaches, joint or bone pains.    Female patients MUST use two simultaneous methods of family planning. Accutane is Category X for pregnancy, meaning it will cause fetal teratogenic malformations, and pregnancy MUST be avoided while on this drug. For that reason, the patient is admonished to never share the medication.    The dose is 0.5-1 mg/kg in two divided doses daily for 15-20 weeks.    After discussion of these important issues, he indicates complete understanding of all of the above, and Does wish to proceed with accutane therapy. Discussed the importance of sticking  with the program, not picking or excoriating.    Dose:  Month # : 2.  Oral Isotretinoin : 30 mg po BID.  Change from previous dose : Yes - increase.  Insurance preferred brand : None.    Oral isotretinoin dosing:  Month #1 (prescribed on 2/5/2018) : 20 mg po BID x 30 days = 1200 mg.    Total dose to date : 1200 mg      PROCEDURES:                                                    None.    TT : 20 minutes.  CT : 15 minutes, with 50% of time spent reviewing lab work and counseling patient about side effects, benefits and risks of oral isotretinoin.      The information in this document, created by the medical scribe for me, accurately reflects the services I personally performed and the decisions made by me. I have reviewed and approved this document for accuracy prior to leaving the patient care area.  Glo Montoya MD March 5, 2018 8:05 AM  [facility]

## 2018-03-05 NOTE — PATIENT INSTRUCTIONS
"FUTURE APPOINTMENTS  Follow up in 28-31 days.    ORAL ISOTRETINOIN INSTRUCTIONS  CURRENT DOSAGE: Take by mouth one 30 mg tablet, two times a day.      Stop all other acne cleansers or creams.    Take the isotretinoin with a fatty meal (such as peanut butter or yogurt) to improve the absorption of the medication.    OFFICE VISIT INSTRUCTIONS    Schedule follow-up appointments every 28-31 days.    Bring iPledge ID# and PASSWORD with you to each office visit. Make sure you have obtained your password before the next office visit.    You will need to do a lab blood draw every month:    Schedule blood draw to be completed 1-2 days prior to each office visit  You may complete these at any [facility] lab; just remember to schedule it before you go.      If you are being seen elsewhere by a dermatologist for oral isotretinoin monitoring, be sure to go into iPledge for \"transfer of care\" for the appropriate physician.    Make sure to always  your medication within 3 days of prescription being sent to the pharmacy    Check with your insurance company for coverage of oral isotretinoin therapy for recalcitrant acne. Ask if they have a preferred brand of oral isotretinoin (e.g. Claravis, Myorisan, Zenatane, Amnesteem, Sotret)    RECOMMENDATIONS FOR DRYNESS    Moisturizer : Cetaphil facial moisturizer.    Nasal mist spray : Ocean brand. Use at bedtime.    Do not use clarita-synephrine.    Lips : Aquaphor ointment, Vaseline jelly, or Vanicream lip protectant for the dryness on the lips.    Eye drops : Refresh tears saline eye drops for dry eye symptoms. Consider also use of gel eye drops at bedtime if excessive eye dryness.    Due to dryness of mouth, floss daily and brush teeth at least twice daily.  "

## 2018-03-05 NOTE — MR AVS SNAPSHOT
"              After Visit Summary   3/5/2018    Juan C Allen    MRN: [id number]           Patient Information     Date Of Birth          1995        Visit Information        Provider Department      3/5/2018 8:20 AM Betsy Montoya MD [facility] [place] [place]        Today's Diagnoses     Acne vulgaris    -  1    Long term use of isotretinoin        Encounter for long-term (current) use of high-risk medication          Care Instructions    FUTURE APPOINTMENTS  Follow up in 28-31 days.    ORAL ISOTRETINOIN INSTRUCTIONS  CURRENT DOSAGE: Take by mouth one 30 mg tablet, two times a day.      Stop all other acne cleansers or creams.    Take the isotretinoin with a fatty meal (such as peanut butter or yogurt) to improve the absorption of the medication.    OFFICE VISIT INSTRUCTIONS    Schedule follow-up appointments every 28-31 days.    Bring iPledge ID# and PASSWORD with you to each office visit. Make sure you have obtained your password before the next office visit.    You will need to do a lab blood draw every month:    Schedule blood draw to be completed 1-2 days prior to each office visit  You may complete these at any [facility] lab; just remember to schedule it before you go.      If you are being seen elsewhere by a dermatologist for oral isotretinoin monitoring, be sure to go into iPledge for \"transfer of care\" for the appropriate physician.    Make sure to always  your medication within 3 days of prescription being sent to the pharmacy    Check with your insurance company for coverage of oral isotretinoin therapy for recalcitrant acne. Ask if they have a preferred brand of oral isotretinoin (e.g. Claravis, Myorisan, Zenatane, Amnesteem, Sotret)    RECOMMENDATIONS FOR DRYNESS    Moisturizer : Cetaphil facial moisturizer.    Nasal mist spray : Ocean brand. Use at bedtime.    Do not use clarita-synephrine.    Lips : Aquaphor ointment, Vaseline jelly, or Vanicream lip protectant for the " "dryness on the lips.    Eye drops : Refresh tears saline eye drops for dry eye symptoms. Consider also use of gel eye drops at bedtime if excessive eye dryness.    Due to dryness of mouth, floss daily and brush teeth at least twice daily.          Follow-ups after your visit        Your next 10 appointments already scheduled     Mar 05, 2018  8:20 AM CST   Office Visit with Betsy Montoya MD   [facility] [place]    [address] [place] [id number] [phone]           Bring a current list of meds and any records pertaining to this visit. For Physicals, please bring immunization records and any forms needing to be filled out. Please arrive 10 minutes early to complete paperwork.              Who to contact     If you have questions or need follow up information about today's clinic visit or your schedule please contact [facility] [place] [facility] [place] directly at [phone].  Normal or non-critical lab and imaging results will be communicated to you by [employer], letter or phone within 4 business days after the clinic has received the results. If you do not hear from us within 7 days, please contact the clinic through [employer] or phone. If you have a critical or abnormal lab result, we will notify you by phone as soon as possible.  Submit refill requests through [employer] or call your pharmacy and they will forward the refill request to us. Please allow 3 business days for your refill to be completed.          Additional Information About Your Visit        [employer] Information     [employer] lets you send messages to your doctor, view your test results, renew your prescriptions, schedule appointments and more. To sign up, go to [email or web address] . Click on \"Log in\" on the left side of the screen, which will take you to the Welcome page. Then click on \"Sign up Now\" on the right side of the page.     You will be asked to enter the access code " listed below, as well as some personal information. Please follow the directions to create your username and password.     Your access code is: [email or web address]  Expires: 2018  8:34 AM     Your access code will  in 90 days. If you need help or a new code, please call your [place] clinic or [phone].        Care EveryWhere ID     This is your Care EveryWhere ID. This could be used by other organizations to access your [place] medical records  [id number]         Blood Pressure from Last 3 Encounters:   16 123/81   11/20/15 112/74   14 126/86    Weight from Last 3 Encounters:   16 135 lb (61.2 kg)   11/20/15 119 lb (54 kg) (3 %)*   14 149 lb (67.6 kg) (49 %)*     * Growth percentiles are based on [facility] 2-20 Years data.              Today, you had the following     No orders found for display         Today's Medication Changes          These changes are accurate as of 3/5/18  8:13 AM.  If you have any questions, ask your nurse or doctor.               Stop taking these medicines if you haven't already. Please contact your care team if you have questions.     clindamycin 1 % topical gel   Commonly known as:  CLINDAMAX   Stopped by:  Betsy Montoya MD                    Primary Care Provider Fax #    Provider Not In System [phone]                Equal Access to Services     [facility] AH: Hadii angelina Segura, waaxda luqadaha, qaybta kaalmatiffanie johnson . So [facility] [phone].    ATENCIÓN: Si habla español, tiene a albarran disposición servicios gratuitos de asistencia lingüística. Llame al [phone].    We comply with applicable federal civil rights laws and [place] laws. We do not discriminate on the basis of race, color, national origin, age, disability, sex, sexual orientation, or gender identity.            Thank you!     Thank you for choosing [facility] [place]  for your care. Our goal is always to  provide you with excellent care. Hearing back from our patients is one way we can continue to improve our services. Please take a few minutes to complete the written survey that you may receive in the mail after your visit with us. Thank you!             Your Updated Medication List - Protect others around you: Learn how to safely use, store and throw away your medicines at www.disposemymeds.org.          This list is accurate as of 3/5/18  8:13 AM.  Always use your most recent med list.                   Brand Name Dispense Instructions for use Diagnosis    ISOtretinoin 20 MG capsule    ACCUTANE    60 capsule    Take 1 capsule (20 mg) by mouth 2 times daily    Acne vulgaris, Long term use of isotretinoin, Encounter for long-term (current) use of high-risk medication

## 2018-03-05 NOTE — LETTER
March 5, 2018    Juan C Allen  [address]  [place] [address]        Dear Juan C,    This is a letter regarding your completed lab results. The tested values are below and were normal.    Office Visit on 03/05/2018   Component Date Value Ref Range Status     AST 03/05/2018 27  0 - 45 U/L Final     WBC 03/05/2018 6.9  4.0 - 11.0 10e9/L Final     RBC Count 03/05/2018 4.50  4.4 - 5.9 10e12/L Final     Hemoglobin 03/05/2018 13.4  13.3 - 17.7 g/dL Final     Hematocrit 03/05/2018 39.6* 40.0 - 53.0 % Final     MCV 03/05/2018 88  78 - 100 fl Final     MCH 03/05/2018 29.8  26.5 - 33.0 pg Final     MCHC 03/05/2018 33.8  31.5 - 36.5 g/dL Final     RDW 03/05/2018 11.9  10.0 - 15.0 % Final     Platelet Count 03/05/2018 156  150 - 450 10e9/L Final     Diff Method 03/05/2018 Automated Method   Final     % Neutrophils 03/05/2018 39.8  % Final     % Lymphocytes 03/05/2018 45.8  % Final     % Monocytes 03/05/2018 12.3  % Final     % Eosinophils 03/05/2018 1.7  % Final     % Basophils 03/05/2018 0.4  % Final     Absolute Neutrophil 03/05/2018 2.7  1.6 - 8.3 10e9/L Final     Absolute Lymphocytes 03/05/2018 3.2  0.8 - 5.3 10e9/L Final     Absolute Monocytes 03/05/2018 0.9  0.0 - 1.3 10e9/L Final     Absolute Eosinophils 03/05/2018 0.1  0.0 - 0.7 10e9/L Final     Absolute Basophils 03/05/2018 0.0  0.0 - 0.2 10e9/L Final     Cholesterol 03/05/2018 162  <200 mg/dL Final     Triglycerides 03/05/2018 70  <150 mg/dL Final     HDL Cholesterol 03/05/2018 59  >39 mg/dL Final     LDL Cholesterol Calculated 03/05/2018 89  <100 mg/dL Final     Non HDL Cholesterol 03/05/2018 103  <130 mg/dL Final         Thank you for allowing me to be involved in your health care and for choosing [facility].  If you have any questions or concerns please feel free to contact me at [phone].      Sincerely,      Betsy Montoya M.D.

## 2018-05-23 ENCOUNTER — OFFICE VISIT (OUTPATIENT)
Dept: FAMILY MEDICINE | Facility: CLINIC | Age: 23
End: 2018-05-23
Payer: COMMERCIAL

## 2018-05-23 VITALS — OXYGEN SATURATION: 98 % | DIASTOLIC BLOOD PRESSURE: 79 MMHG | SYSTOLIC BLOOD PRESSURE: 117 MMHG | HEART RATE: 62 BPM

## 2018-05-23 DIAGNOSIS — L70.0 ACNE VULGARIS: Primary | ICD-10-CM

## 2018-05-23 DIAGNOSIS — Z79.899 LONG TERM USE OF ISOTRETINOIN: ICD-10-CM

## 2018-05-23 DIAGNOSIS — Z79.899 ENCOUNTER FOR LONG-TERM (CURRENT) USE OF HIGH-RISK MEDICATION: ICD-10-CM

## 2018-05-23 LAB
AST SERPL W P-5'-P-CCNC: 26 U/L (ref 0–45)
BASOPHILS # BLD AUTO: 0 10E9/L (ref 0–0.2)
BASOPHILS NFR BLD AUTO: 0.3 %
CHOLEST SERPL-MCNC: 145 MG/DL
DIFFERENTIAL METHOD BLD: NORMAL
EOSINOPHIL # BLD AUTO: 0.2 10E9/L (ref 0–0.7)
EOSINOPHIL NFR BLD AUTO: 1.9 %
ERYTHROCYTE [DISTWIDTH] IN BLOOD BY AUTOMATED COUNT: 12.3 % (ref 10–15)
HCT VFR BLD AUTO: 42.2 % (ref 40–53)
HDLC SERPL-MCNC: 46 MG/DL
HGB BLD-MCNC: 14.3 G/DL (ref 13.3–17.7)
LDLC SERPL CALC-MCNC: 70 MG/DL
LYMPHOCYTES # BLD AUTO: 2.6 10E9/L (ref 0.8–5.3)
LYMPHOCYTES NFR BLD AUTO: 33.8 %
MCH RBC QN AUTO: 29.5 PG (ref 26.5–33)
MCHC RBC AUTO-ENTMCNC: 33.9 G/DL (ref 31.5–36.5)
MCV RBC AUTO: 87 FL (ref 78–100)
MONOCYTES # BLD AUTO: 0.8 10E9/L (ref 0–1.3)
MONOCYTES NFR BLD AUTO: 10.4 %
NEUTROPHILS # BLD AUTO: 4.2 10E9/L (ref 1.6–8.3)
NEUTROPHILS NFR BLD AUTO: 53.6 %
NONHDLC SERPL-MCNC: 99 MG/DL
PLATELET # BLD AUTO: 157 10E9/L (ref 150–450)
RBC # BLD AUTO: 4.85 10E12/L (ref 4.4–5.9)
TRIGL SERPL-MCNC: 143 MG/DL
WBC # BLD AUTO: 7.8 10E9/L (ref 4–11)

## 2018-05-23 PROCEDURE — 99214 OFFICE O/P EST MOD 30 MIN: CPT | Performed by: FAMILY MEDICINE

## 2018-05-23 PROCEDURE — 84450 TRANSFERASE (AST) (SGOT): CPT | Performed by: FAMILY MEDICINE

## 2018-05-23 PROCEDURE — 80061 LIPID PANEL: CPT | Performed by: FAMILY MEDICINE

## 2018-05-23 PROCEDURE — 36415 COLL VENOUS BLD VENIPUNCTURE: CPT | Performed by: FAMILY MEDICINE

## 2018-05-23 PROCEDURE — 85025 COMPLETE CBC W/AUTO DIFF WBC: CPT | Performed by: FAMILY MEDICINE

## 2018-05-23 RX ORDER — ISOTRETINOIN 20 MG/1
20 CAPSULE ORAL 2 TIMES DAILY
Qty: 60 CAPSULE | Refills: 0 | Status: SHIPPED | OUTPATIENT
Start: 2018-05-23 | End: 2019-01-30

## 2018-05-23 NOTE — PATIENT INSTRUCTIONS
"FUTURE APPOINTMENTS  Follow up in 28-31 days.    ORAL ISOTRETINOIN INSTRUCTIONS  CURRENT DOSAGE: Take by mouth one 20 mg tablet, two times a day.      Stop all other acne cleansers or creams.    Take the isotretinoin with a fatty meal (such as peanut butter or yogurt) to improve the absorption of the medication.    OFFICE VISIT INSTRUCTIONS    Schedule follow-up appointments every 28-31 days.    Bring iPledge ID# and PASSWORD with you to each office visit. Make sure you have obtained your password before the next office visit.    You will need to do a lab blood draw every month:    Schedule blood draw to be completed 1-2 days prior to each office visit  You may complete these at any [facility] lab; just remember to schedule it before you go.      If you are being seen elsewhere by a dermatologist for oral isotretinoin monitoring, be sure to go into iPledge for \"transfer of care\" for the appropriate physician.    Make sure to always  your medication within 3 days of prescription being sent to the pharmacy    Check with your insurance company for coverage of oral isotretinoin therapy for recalcitrant acne. Ask if they have a preferred brand of oral isotretinoin (e.g. Claravis, Myorisan, Zenatane, Amnesteem, Sotret)    RECOMMENDATIONS FOR DRYNESS    Moisturizer : Cetaphil facial moisturizer.    Nasal mist spray : Ocean brand. Use at bedtime.    Do not use clarita-synephrine.    Lips : Aquaphor ointment, Vaseline jelly, or Vanicream lip protectant for the dryness on the lips.    Eye drops : Refresh tears saline eye drops for dry eye symptoms. Consider also use of gel eye drops at bedtime if excessive eye dryness.    Due to dryness of mouth, floss daily and brush teeth at least twice daily.    Apply sunscreen regularly.  "

## 2018-05-23 NOTE — PROGRESS NOTES
"[facility] - PRIMARY CARE SKIN    CC : Acne and Oral Isotretinoin Follow-up   SUBJECTIVE:                                                    Juan C Allen is a 23 year old male who presents to clinic today for follow-up of oral isotretinoin therapy for severe, recalcitrant acne. He requests re-start of oral isotretinoin; however he has had difficulty complying with regular follow-ups. Today, he reports that \"insurance coverage won't be cut off for the rest of the year.\"    He is concerned about scarring from sun exposure creating darker pigmentation at sites of old acne lesions.    Months completed : 2 (last prescribed 3/5/18)  Current dosage : 30 mg BID.  Treatment response : Acne is still forming on the face, neck and chest.  Side effects noted : Dryness has been an issue in the past, and he has also had difficulty applying emollients and moisturizer regularly.    PHQ-2 Score:   PHQ-2 ( 1999 Pfizer) 3/5/2018 11/20/2015   Q1: Little interest or pleasure in doing things 0 0   Q2: Feeling down, depressed or hopeless 0 0   PHQ-2 Score 0 0     Previous treatments include :   Oral isotretinoin (however, courses of oral isotretinoin with 7-8 week gaps in patient follow-up have been discontinued due to patient non-compliance with follow-up). He previously had insurance issues. This current oral isotretinoin course was begun on 2/5/18, and he again reports insurance issues precluding regular follow-up.     iPLDEGE #: [id number]     Refer to electronic medical record (EMR) for past medical history and medications.    INTEGUMENTARY/SKIN: POSITIVE for acne  ROS : 14 point review of systems was negative except the symptoms listed above in the HPI.    This document serves as a record of the services and decisions personally performed and made by Glo Montoya MD. It was created on her behalf by Nico Alfred, a trained medical scribe.  The creation of this document is based on the scribe's personal observations and the " provider's statements to the medical scribe.  Nico Alfred, May 23, 2018 8:34 AM      OBJECTIVE:                                                      Wt Readings from Last 2 Encounters:   06/25/16 135 lb (61.2 kg)   11/20/15 119 lb (54 kg) (3 %)*     * Growth percentiles are based on [facility] 2-20 Years data.     GENERAL: healthy, alert and no distress  SKIN: Soto Skin Type - V.  Face, Neck and Trunk were examined. The dermatoscope was used to help evaluate pigmented lesions.  Skin Pertinent Findings:  Chest : scattered inflammatory papules  Neck : residual scarring and hyperpigmentation  Back : some closed comedones and residual hyperpigmentation   Face : residual scars, inflammatory papules     Diagnostic Test Results:  No results found for this or any previous visit (from the past 24 hour(s)).      ASSESSMENT:                                                      Encounter Diagnosis   Name Primary?     Acne vulgaris Yes     Comment : severe acne, oral isotretinoin treatment with monthly monitoring.      PLAN:                                                    Patient Instructions   FUTURE APPOINTMENTS  Follow up in 28-31 days.    ORAL ISOTRETINOIN INSTRUCTIONS  CURRENT DOSAGE: Take by mouth one 20 mg tablet, two times a day.      Stop all other acne cleansers or creams.    Take the isotretinoin with a fatty meal (such as peanut butter or yogurt) to improve the absorption of the medication.    OFFICE VISIT INSTRUCTIONS    Schedule follow-up appointments every 28-31 days.    Bring iPledge ID# and PASSWORD with you to each office visit. Make sure you have obtained your password before the next office visit.    You will need to do a lab blood draw every month:    Schedule blood draw to be completed 1-2 days prior to each office visit  You may complete these at any [facility] lab; just remember to schedule it before you go.      If you are being seen elsewhere by a dermatologist for oral isotretinoin monitoring,  "be sure to go into iPledge for \"transfer of care\" for the appropriate physician.    Make sure to always  your medication within 3 days of prescription being sent to the pharmacy    Check with your insurance company for coverage of oral isotretinoin therapy for recalcitrant acne. Ask if they have a preferred brand of oral isotretinoin (e.g. Claravis, Myorisan, Zenatane, Amnesteem, Sotret)    RECOMMENDATIONS FOR DRYNESS    Moisturizer : Cetaphil facial moisturizer.    Nasal mist spray : Ocean brand. Use at bedtime.    Do not use clarita-synephrine.    Lips : Aquaphor ointment, Vaseline jelly, or Vanicream lip protectant for the dryness on the lips.    Eye drops : Refresh tears saline eye drops for dry eye symptoms. Consider also use of gel eye drops at bedtime if excessive eye dryness.    Due to dryness of mouth, floss daily and brush teeth at least twice daily.    Apply sunscreen regularly.    RTC in one month for follow up, or sooner prn, with laboratory studies completed.    Oral isotretinoin is again discussed fully with the patient.    It is a very effective drug to treat acne vulgaris but has many significant side effects. Chief among these are teratogenesis, hepatic injury, dyslipidemia and severe drying of the mucous membranes. All of these issues have been discussed in detail. Monthly blood tests to monitor lipids and liver functions will be necessary. Expect painful dryness and/or fissuring around the lips, eyes, and other moist areas of the body. Balms may be protective. Contact lenses may be too painful to wear temporarily while on this drug. Episodes of significant depression have been reported, including suicidal ideation and attempts in rare cases. It may also cause pseudotumor cerebri and hyperostosis. The patient will report any such changes in mood, depressive symptoms or suicidal thoughts, headaches, joint or bone pains.    Female patients MUST use two simultaneous methods of family planning. " Accutane is Category X for pregnancy, meaning it will cause fetal teratogenic malformations, and pregnancy MUST be avoided while on this drug. For that reason, the patient is admonished to never share the medication.    The dose is 0.5-1 mg/kg in two divided doses daily for 15-20 weeks.    After discussion of these important issues, he indicates complete understanding of all of the above, and Does wish to proceed with accutane therapy. Discussed the importance of sticking with the program, not picking or excoriating.    Dose:  Month # : 3.  Oral Isotretinoin : 20 mg po BID.  Insurance preferred brand : None.  Plan for next month : patient states that he will have insurance coverage for the rest of the year.    Oral isotretinoin dosing:  Month #1 (prescribed on 2/5/2018) : 20 mg po BID x 30 days = 1200 mg.  Month #2 (prescribed on 3/5/2018) : 30 mg po BID x 30 days = 1800 mg.    Total dose to date : 3000 mg      PROCEDURES:                                                    None.    TT : 20 minutes.  CT : 15 minutes, with 50% of time spent reviewing lab work and counseling patient about side effects, benefits and risks of oral isotretinoin.      The information in this document, created by the medical scribe for me, accurately reflects the services I personally performed and the decisions made by me. I have reviewed and approved this document for accuracy prior to leaving the patient care area.  Glo Montoya MD May 23, 2018 8:33 AM  [facility]

## 2018-05-23 NOTE — LETTER
"    5/23/2018         RE: Juan C Allen  [address]  [place] [address]        Dear Colleague,    Thank you for referring your patient, Juan C Allen, to the [facility] [place]. Please see a copy of my visit note below.    [facility] - PRIMARY CARE SKIN    CC : Acne and Oral Isotretinoin Follow-up   SUBJECTIVE:                                                    Juan C Allen is a 23 year old male who presents to clinic today for follow-up of oral isotretinoin therapy for severe, recalcitrant acne. He requests re-start of oral isotretinoin; however he has had difficulty complying with regular follow-ups. Today, he reports that \"insurance coverage won't be cut off for the rest of the year.\"    He is concerned about scarring from sun exposure creating darker pigmentation at sites of old acne lesions.    Months completed : 2 (last prescribed 3/5/18)  Current dosage : 30 mg BID.  Treatment response : Acne is still forming on the face, neck and chest.  Side effects noted : Dryness has been an issue in the past, and he has also had difficulty applying emollients and moisturizer regularly.    PHQ-2 Score:   PHQ-2 ( 1999 Pfizer) 3/5/2018 11/20/2015   Q1: Little interest or pleasure in doing things 0 0   Q2: Feeling down, depressed or hopeless 0 0   PHQ-2 Score 0 0     Previous treatments include :   Oral isotretinoin (however, courses of oral isotretinoin with 7-8 week gaps in patient follow-up have been discontinued due to patient non-compliance with follow-up). He previously had insurance issues. This current oral isotretinoin course was begun on 2/5/18, and he again reports insurance issues precluding regular follow-up.     Spruce MediaDEGE #: [id number]     Refer to electronic medical record (EMR) for past medical history and medications.    INTEGUMENTARY/SKIN: POSITIVE for acne  ROS : 14 point review of systems was negative except the symptoms listed above in the HPI.    This document serves as a record of " the services and decisions personally performed and made by Glo Montoya MD. It was created on her behalf by Nico Alfred, a trained medical scribe.  The creation of this document is based on the scribe's personal observations and the provider's statements to the medical scribe.  Nico Alfred, May 23, 2018 8:34 AM      OBJECTIVE:                                                      Wt Readings from Last 2 Encounters:   06/25/16 135 lb (61.2 kg)   11/20/15 119 lb (54 kg) (3 %)*     * Growth percentiles are based on [facility] 2-20 Years data.     GENERAL: healthy, alert and no distress  SKIN: Soto Skin Type - V.  Face, Neck and Trunk were examined. The dermatoscope was used to help evaluate pigmented lesions.  Skin Pertinent Findings:  Chest : scattered inflammatory papules  Neck : residual scarring and hyperpigmentation  Back : some closed comedones and residual hyperpigmentation   Face : residual scars, inflammatory papules     Diagnostic Test Results:  No results found for this or any previous visit (from the past 24 hour(s)).      ASSESSMENT:                                                      Encounter Diagnosis   Name Primary?     Acne vulgaris Yes     Comment : severe acne, oral isotretinoin treatment with monthly monitoring.      PLAN:                                                    Patient Instructions   FUTURE APPOINTMENTS  Follow up in 28-31 days.    ORAL ISOTRETINOIN INSTRUCTIONS  CURRENT DOSAGE: Take by mouth one 20 mg tablet, two times a day.      Stop all other acne cleansers or creams.    Take the isotretinoin with a fatty meal (such as peanut butter or yogurt) to improve the absorption of the medication.    OFFICE VISIT INSTRUCTIONS    Schedule follow-up appointments every 28-31 days.    Bring iPledge ID# and PASSWORD with you to each office visit. Make sure you have obtained your password before the next office visit.    You will need to do a lab blood draw every month:    Schedule blood draw to be  "completed 1-2 days prior to each office visit  You may complete these at any [facility] lab; just remember to schedule it before you go.      If you are being seen elsewhere by a dermatologist for oral isotretinoin monitoring, be sure to go into iPledge for \"transfer of care\" for the appropriate physician.    Make sure to always  your medication within 3 days of prescription being sent to the pharmacy    Check with your insurance company for coverage of oral isotretinoin therapy for recalcitrant acne. Ask if they have a preferred brand of oral isotretinoin (e.g. Claravis, Myorisan, Zenatane, Amnesteem, Sotret)    RECOMMENDATIONS FOR DRYNESS    Moisturizer : Cetaphil facial moisturizer.    Nasal mist spray : Ocean brand. Use at bedtime.    Do not use clarita-synephrine.    Lips : Aquaphor ointment, Vaseline jelly, or Vanicream lip protectant for the dryness on the lips.    Eye drops : Refresh tears saline eye drops for dry eye symptoms. Consider also use of gel eye drops at bedtime if excessive eye dryness.    Due to dryness of mouth, floss daily and brush teeth at least twice daily.    Apply sunscreen regularly.    RTC in one month for follow up, or sooner prn, with laboratory studies completed.    Oral isotretinoin is again discussed fully with the patient.    It is a very effective drug to treat acne vulgaris but has many significant side effects. Chief among these are teratogenesis, hepatic injury, dyslipidemia and severe drying of the mucous membranes. All of these issues have been discussed in detail. Monthly blood tests to monitor lipids and liver functions will be necessary. Expect painful dryness and/or fissuring around the lips, eyes, and other moist areas of the body. Balms may be protective. Contact lenses may be too painful to wear temporarily while on this drug. Episodes of significant depression have been reported, including suicidal ideation and attempts in rare cases. It may also cause " pseudotumor cerebri and hyperostosis. The patient will report any such changes in mood, depressive symptoms or suicidal thoughts, headaches, joint or bone pains.    Female patients MUST use two simultaneous methods of family planning. Accutane is Category X for pregnancy, meaning it will cause fetal teratogenic malformations, and pregnancy MUST be avoided while on this drug. For that reason, the patient is admonished to never share the medication.    The dose is 0.5-1 mg/kg in two divided doses daily for 15-20 weeks.    After discussion of these important issues, he indicates complete understanding of all of the above, and Does wish to proceed with accutane therapy. Discussed the importance of sticking with the program, not picking or excoriating.    Dose:  Month # : 3.  Oral Isotretinoin : 20 mg po BID.  Insurance preferred brand : None.  Plan for next month : patient states that he will have insurance coverage for the rest of the year.    Oral isotretinoin dosing:  Month #1 (prescribed on 2/5/2018) : 20 mg po BID x 30 days = 1200 mg.  Month #2 (prescribed on 3/5/2018) : 30 mg po BID x 30 days = 1800 mg.    Total dose to date : 3000 mg      PROCEDURES:                                                    None.    TT : 20 minutes.  CT : 15 minutes, with 50% of time spent reviewing lab work and counseling patient about side effects, benefits and risks of oral isotretinoin.      The information in this document, created by the medical scribe for me, accurately reflects the services I personally performed and the decisions made by me. I have reviewed and approved this document for accuracy prior to leaving the patient care area.  Glo Montoya MD May 23, 2018 8:33 AM  [facility]    Again, thank you for allowing me to participate in the care of your patient.        Sincerely,        Betsy Montoya MD

## 2018-05-23 NOTE — MR AVS SNAPSHOT
"              After Visit Summary   5/23/2018    Juan C Allen    MRN: [id number]           Patient Information     Date Of Birth          1995        Visit Information        Provider Department      5/23/2018 8:40 AM Betsy Montoya MD [facility] [place]        Today's Diagnoses     Acne vulgaris    -  1      Care Instructions    FUTURE APPOINTMENTS  Follow up in 28-31 days.    ORAL ISOTRETINOIN INSTRUCTIONS  CURRENT DOSAGE: Take by mouth one 20 mg tablet, two times a day.      Stop all other acne cleansers or creams.    Take the isotretinoin with a fatty meal (such as peanut butter or yogurt) to improve the absorption of the medication.    OFFICE VISIT INSTRUCTIONS    Schedule follow-up appointments every 28-31 days.    Bring iPledge ID# and PASSWORD with you to each office visit. Make sure you have obtained your password before the next office visit.    You will need to do a lab blood draw every month:    Schedule blood draw to be completed 1-2 days prior to each office visit  You may complete these at any [facility] lab; just remember to schedule it before you go.      If you are being seen elsewhere by a dermatologist for oral isotretinoin monitoring, be sure to go into iPledge for \"transfer of care\" for the appropriate physician.    Make sure to always  your medication within 3 days of prescription being sent to the pharmacy    Check with your insurance company for coverage of oral isotretinoin therapy for recalcitrant acne. Ask if they have a preferred brand of oral isotretinoin (e.g. Claravis, Myorisan, Zenatane, Amnesteem, Sotret)    RECOMMENDATIONS FOR DRYNESS    Moisturizer : Cetaphil facial moisturizer.    Nasal mist spray : Ocean brand. Use at bedtime.    Do not use clarita-synephrine.    Lips : Aquaphor ointment, Vaseline jelly, or Vanicream lip protectant for the dryness on the lips.    Eye drops : Refresh tears saline eye drops for dry eye symptoms. Consider also use " "of gel eye drops at bedtime if excessive eye dryness.    Due to dryness of mouth, floss daily and brush teeth at least twice daily.    Apply sunscreen regularly.          Follow-ups after your visit        Who to contact     If you have questions or need follow up information about today's clinic visit or your schedule please contact [facility] [place] directly at [phone].  Normal or non-critical lab and imaging results will be communicated to you by PolySpothart, letter or phone within 4 business days after the clinic has received the results. If you do not hear from us within 7 days, please contact the clinic through [employer]art or phone. If you have a critical or abnormal lab result, we will notify you by phone as soon as possible.  Submit refill requests through "[email or web address]" or call your pharmacy and they will forward the refill request to us. Please allow 3 business days for your refill to be completed.          Additional Information About Your Visit        PolySpotharLOOKCAST Information     "[email or web address]" lets you send messages to your doctor, view your test results, renew your prescriptions, schedule appointments and more. To sign up, go to [email or web address]" . Click on \"Log in\" on the left side of the screen, which will take you to the Welcome page. Then click on \"Sign up Now\" on the right side of the page.     You will be asked to enter the access code listed below, as well as some personal information. Please follow the directions to create your username and password.     Your access code is: [email or web address]  Expires: 2018  9:02 AM     Your access code will  in 90 days. If you need help or a new code, please call your [facility] clinic or [phone].        Care EveryWhere ID     This is your Care EveryWhere ID. This could be used by other organizations to access your [facility] medical records  [id number]        Your Vitals Were     Pulse Pulse Oximetry                62 98%           Blood Pressure from " Last 3 Encounters:   05/23/18 117/79   06/25/16 123/81   11/20/15 112/74    Weight from Last 3 Encounters:   06/25/16 135 lb (61.2 kg)   11/20/15 119 lb (54 kg) (3 %)*   08/17/14 149 lb (67.6 kg) (49 %)*     * Growth percentiles are based on [facility] 2-20 Years data.              Today, you had the following     No orders found for display         Today's Medication Changes          These changes are accurate as of 5/23/18  9:02 AM.  If you have any questions, ask your nurse or doctor.               Stop taking these medicines if you haven't already. Please contact your care team if you have questions.     ISOtretinoin 30 MG Caps   Stopped by:  Betsy Montoya MD                    Primary Care Provider Fax #    Provider Not In System [phone]                Equal Access to Services     [facility]: Tony Segura, pdero colon, hiren beverly, tiffanie green . So [facility] [phone].    ATENCIÓN: Si habla español, tiene a albarran disposición servicios gratuitos de asistencia lingüística. Llame al [phone].    We comply with applicable federal civil rights laws and [place] laws. We do not discriminate on the basis of race, color, national origin, age, disability, sex, sexual orientation, or gender identity.            Thank you!     Thank you for choosing [facility]  for your care. Our goal is always to provide you with excellent care. Hearing back from our patients is one way we can continue to improve our services. Please take a few minutes to complete the written survey that you may receive in the mail after your visit with us. Thank you!             Your Updated Medication List - Protect others around you: Learn how to safely use, store and throw away your medicines at www.disposemymeds.org.      Notice  As of 5/23/2018  9:02 AM    You have not been prescribed any medications.

## 2019-01-23 ENCOUNTER — OFFICE VISIT (OUTPATIENT)
Dept: FAMILY MEDICINE | Facility: CLINIC | Age: 24
End: 2019-01-23
Payer: COMMERCIAL

## 2019-01-23 VITALS — OXYGEN SATURATION: 98 % | HEART RATE: 76 BPM | SYSTOLIC BLOOD PRESSURE: 118 MMHG | DIASTOLIC BLOOD PRESSURE: 72 MMHG

## 2019-01-23 DIAGNOSIS — L70.0 ACNE VULGARIS: Primary | ICD-10-CM

## 2019-01-23 DIAGNOSIS — Z79.899 ENCOUNTER FOR LONG-TERM (CURRENT) USE OF HIGH-RISK MEDICATION: ICD-10-CM

## 2019-01-23 DIAGNOSIS — Z79.899 LONG-TERM CURRENT USE OF ISOTRETINOIN: ICD-10-CM

## 2019-01-23 PROCEDURE — 99213 OFFICE O/P EST LOW 20 MIN: CPT | Performed by: FAMILY MEDICINE

## 2019-01-23 RX ORDER — ISOTRETINOIN 20 MG/1
20 CAPSULE ORAL 2 TIMES DAILY
Qty: 60 CAPSULE | Refills: 0 | Status: SHIPPED | OUTPATIENT
Start: 2019-01-23 | End: 2019-01-30

## 2019-01-23 NOTE — LETTER
"    1/23/2019         RE: Juan C Allen  [address]  [place] [address]        Dear Colleague,    Thank you for referring your patient, Juan C Allen, to the [facility] [place]. Please see a copy of my visit note below.    [facility] - PRIMARY CARE SKIN    CC : Acne  SUBJECTIVE:                                                    Juan C Allen is a 23 year old male who presents to clinic today for follow-up of acne.    He requests another re-start of oral isotretinoin; however, he has had difficulty complying with regular follow-ups as this would be his fifth initiation of oral isotretinoin therapy.     Today, he again reports that insurance has been renewed for the year.    Previous treatments include :   Oral isotretinoin (however, courses of oral isotretinoin with 7-8 week gaps in patient follow-up have been discontinued due to patient non-compliance with follow-up). He previously had insurance issues, but he reported on 5/23/2018 that \"insurance coverage won't be cut off for the rest of the year.\"    Family history of acne: Brother finished a course of oral isotretinoin.    CooleafDEGE #: [id number]    Occupation: working and school    Refer to electronic medical record (EMR) for past medical history and medications.    INTEGUMENTARY/SKIN: POSITIVE for acne  ROS : 14 point review of systems was negative except the symptoms listed above in the HPI.    This document serves as a record of the services and decisions personally performed and made by Betsy Montoya MD and was created by Nico Alfred, a trained medical scribe, based on personal observations and provider statements to the medical scribe.  January 23, 2019 11:59 AM   Nico Alfred    OBJECTIVE:                                                      Wt Readings from Last 2 Encounters:   06/25/16 135 lb (61.2 kg)   11/20/15 119 lb (54 kg) (3 %)*     * Growth percentiles are based on CDC (Boys, 2-20 Years) data.     GENERAL: healthy, alert and " "no distress  SKIN: Soto Skin Type - V.  Face, Neck and Trunk were examined. The dermatoscope was used to help evaluate pigmented lesions.  Skin Pertinent Findings:  Back, chest : multiple inflammatory papules and nodules.  Face : residual post-inflammatory hyperpigmentation and inflammatory papules.    Diagnostic Test Results:  No results found for this or any previous visit (from the past 24 hour(s)).    ASSESSMENT:                                                      Encounter Diagnoses   Name Primary?     Acne vulgaris Yes     Long-term current use of isotretinoin      Encounter for long-term (current) use of high-risk medication      Comment : severe acne, oral isotretinoin treatment with monthly monitoring.  .    PLAN:                                                    Patient Instructions   FUTURE APPOINTMENTS  Follow up in 28-31 days.  Call back immediately to the clinic if you cannot  the prescription.    ORAL ISOTRETINOIN INSTRUCTIONS  CURRENT DOSAGE: Take by mouth one 20 mg tablet, two times a day.      Stop all other acne products, except you may use only Cetaphil or CeraVe facial cleanser.    Take the isotretinoin with a fatty meal (such as peanut butter or yogurt) to improve the absorption of the medication.    OFFICE VISIT INSTRUCTIONS    Schedule follow-up appointments every 28-31 days.    Bring iPledge ID# and PASSWORD with you to each office visit. Make sure you have obtained your password before the next office visit.    IF YOU HAVE NOT RECEIVED YOUR PASSWORD IN THE MAIL IN 2 WEEKS, CALL IPLEDGE.    You will need to do a lab blood draw every month:    Schedule blood draw to be completed 1-2 days prior to each office visit  You may complete these at any [facility] lab; just remember to schedule it before you go.      If you are being seen elsewhere by a dermatologist for oral isotretinoin monitoring, be sure to go into iPledge for \"transfer of care\" for the appropriate " physician.    Make sure to always  your medication within 3 days of prescription being sent to the pharmacy    Check with your insurance company for coverage of oral isotretinoin therapy for recalcitrant acne. Ask if they have a preferred brand of oral isotretinoin (e.g. Claravis, Myorisan, Zenatane, Amnesteem, Sotret)    RECOMMENDATIONS FOR DRYNESS    Moisturizer : Cetaphil facial moisturizer.    Nasal mist spray : Ocean brand. Use at bedtime.    Do not use clarita-synephrine.    Lips : Aquaphor ointment, Vaseline jelly, or Vanicream lip protectant for the dryness on the lips.    Eye drops : Refresh tears saline eye drops for dry eye symptoms. Consider also use of gel eye drops at bedtime if excessive eye dryness.    Due to dryness of mouth, floss daily and brush teeth at least twice daily.    Consider supplementation of omega 3 oil 1 gram/day.    RTC in one month for follow up, or sooner prn, with laboratory studies completed.    Oral isotretinoin is again discussed fully with the patient.    It is a very effective drug to treat acne vulgaris but has many significant side effects. Chief among these are teratogenesis, hepatic injury, dyslipidemia and severe drying of the mucous membranes. All of these issues have been discussed in detail. Monthly blood tests to monitor lipids and liver functions will be necessary. Expect painful dryness and/or fissuring around the lips, eyes, and other moist areas of the body. Balms may be protective. Contact lenses may be too painful to wear temporarily while on this drug. Episodes of significant depression have been reported, including suicidal ideation and attempts in rare cases. It may also cause pseudotumor cerebri and hyperostosis. The patient will report any such changes in mood, depressive symptoms or suicidal thoughts, headaches, joint or bone pains.    Female patients MUST use two simultaneous methods of family planning. Accutane is Category X for pregnancy, meaning it  will cause fetal teratogenic malformations, and pregnancy MUST be avoided while on this drug. For that reason, the patient is admonished to never share the medication.    The dose is 0.5-1 mg/kg in two divided doses daily for 15-20 weeks.    After discussion of these important issues, he indicates complete understanding of all of the above, and Does wish to proceed with accutane therapy. Discussed the importance of sticking with the program, not picking or excoriating.    Dose:  Month # : 1.  Oral Isotretinoin : 20 mg po BID.    Goal dosage: one month acne-free, if patient continues to follow regularly.    TT : 20 minutes.  CT : 15 minutes, with 50% of time spent reviewing lab work and counseling patient about side effects, benefits and risks of oral isotretinoin.    The information in this document, created by the medical scribe for me, accurately reflects the services I personally performed and the decisions made by me. I have reviewed and approved this document for accuracy prior to leaving the patient care area.  January 23, 2019 11:57 AM  Betsy Montoya MD  [facility], thank you for allowing me to participate in the care of your patient.        Sincerely,        Betsy Montoya MD

## 2019-01-23 NOTE — PROGRESS NOTES
"[facility] - PRIMARY CARE SKIN    CC : Acne  SUBJECTIVE:                                                    Juan C Allen is a 23 year old male who presents to clinic today for follow-up of acne.    He requests another re-start of oral isotretinoin; however, he has had difficulty complying with regular follow-ups as this would be his fifth initiation of oral isotretinoin therapy.     Today, he again reports that insurance has been renewed for the year.    Previous treatments include :   Oral isotretinoin (however, courses of oral isotretinoin with 7-8 week gaps in patient follow-up have been discontinued due to patient non-compliance with follow-up). He previously had insurance issues, but he reported on 5/23/2018 that \"insurance coverage won't be cut off for the rest of the year.\"    Family history of acne: Brother finished a course of oral isotretinoin.    iPLDEGE #: [id number]    Occupation: working and school    Refer to electronic medical record (EMR) for past medical history and medications.    INTEGUMENTARY/SKIN: POSITIVE for acne  ROS : 14 point review of systems was negative except the symptoms listed above in the HPI.    This document serves as a record of the services and decisions personally performed and made by Betsy Montoya MD and was created by Nico Alfred, a trained medical scribe, based on personal observations and provider statements to the medical scribe.  January 23, 2019 11:59 AM   Nico Alfred    OBJECTIVE:                                                      Wt Readings from Last 2 Encounters:   06/25/16 135 lb (61.2 kg)   11/20/15 119 lb (54 kg) (3 %)*     * Growth percentiles are based on CDC (Boys, 2-20 Years) data.     GENERAL: healthy, alert and no distress  SKIN: Soto Skin Type - V.  Face, Neck and Trunk were examined. The dermatoscope was used to help evaluate pigmented lesions.  Skin Pertinent Findings:  Back, chest : multiple inflammatory papules and nodules.  Face : " "residual post-inflammatory hyperpigmentation and inflammatory papules.    Diagnostic Test Results:  No results found for this or any previous visit (from the past 24 hour(s)).    ASSESSMENT:                                                      Encounter Diagnoses   Name Primary?     Acne vulgaris Yes     Long-term current use of isotretinoin      Encounter for long-term (current) use of high-risk medication      Comment : severe acne, oral isotretinoin treatment with monthly monitoring.  .    PLAN:                                                    Patient Instructions   FUTURE APPOINTMENTS  Follow up in 28-31 days.  Call back immediately to the clinic if you cannot  the prescription.    ORAL ISOTRETINOIN INSTRUCTIONS  CURRENT DOSAGE: Take by mouth one 20 mg tablet, two times a day.      Stop all other acne products, except you may use only Cetaphil or CeraVe facial cleanser.    Take the isotretinoin with a fatty meal (such as peanut butter or yogurt) to improve the absorption of the medication.    OFFICE VISIT INSTRUCTIONS    Schedule follow-up appointments every 28-31 days.    Bring iPledge ID# and PASSWORD with you to each office visit. Make sure you have obtained your password before the next office visit.    IF YOU HAVE NOT RECEIVED YOUR PASSWORD IN THE MAIL IN 2 WEEKS, CALL IPLEDGE.    You will need to do a lab blood draw every month:    Schedule blood draw to be completed 1-2 days prior to each office visit  You may complete these at any [facility] lab; just remember to schedule it before you go.      If you are being seen elsewhere by a dermatologist for oral isotretinoin monitoring, be sure to go into iPledge for \"transfer of care\" for the appropriate physician.    Make sure to always  your medication within 3 days of prescription being sent to the pharmacy    Check with your insurance company for coverage of oral isotretinoin therapy for recalcitrant acne. Ask if they have a preferred " brand of oral isotretinoin (e.g. Claravis, Myorisan, Zenatane, Amnesteem, Sotret)    RECOMMENDATIONS FOR DRYNESS    Moisturizer : Cetaphil facial moisturizer.    Nasal mist spray : Ocean brand. Use at bedtime.    Do not use clarita-synephrine.    Lips : Aquaphor ointment, Vaseline jelly, or Vanicream lip protectant for the dryness on the lips.    Eye drops : Refresh tears saline eye drops for dry eye symptoms. Consider also use of gel eye drops at bedtime if excessive eye dryness.    Due to dryness of mouth, floss daily and brush teeth at least twice daily.    Consider supplementation of omega 3 oil 1 gram/day.    RTC in one month for follow up, or sooner prn, with laboratory studies completed.    Oral isotretinoin is again discussed fully with the patient.    It is a very effective drug to treat acne vulgaris but has many significant side effects. Chief among these are teratogenesis, hepatic injury, dyslipidemia and severe drying of the mucous membranes. All of these issues have been discussed in detail. Monthly blood tests to monitor lipids and liver functions will be necessary. Expect painful dryness and/or fissuring around the lips, eyes, and other moist areas of the body. Balms may be protective. Contact lenses may be too painful to wear temporarily while on this drug. Episodes of significant depression have been reported, including suicidal ideation and attempts in rare cases. It may also cause pseudotumor cerebri and hyperostosis. The patient will report any such changes in mood, depressive symptoms or suicidal thoughts, headaches, joint or bone pains.    Female patients MUST use two simultaneous methods of family planning. Accutane is Category X for pregnancy, meaning it will cause fetal teratogenic malformations, and pregnancy MUST be avoided while on this drug. For that reason, the patient is admonished to never share the medication.    The dose is 0.5-1 mg/kg in two divided doses daily for 15-20  weeks.    After discussion of these important issues, he indicates complete understanding of all of the above, and Does wish to proceed with accutane therapy. Discussed the importance of sticking with the program, not picking or excoriating.    Dose:  Month # : 1.  Oral Isotretinoin : 20 mg po BID.    Goal dosage: one month acne-free, if patient continues to follow regularly.    TT : 20 minutes.  CT : 15 minutes, with 50% of time spent reviewing lab work and counseling patient about side effects, benefits and risks of oral isotretinoin.    The information in this document, created by the medical scribe for me, accurately reflects the services I personally performed and the decisions made by me. I have reviewed and approved this document for accuracy prior to leaving the patient care area.  January 23, 2019 11:57 AM  Betsy Montoya MD  [facility]

## 2019-01-23 NOTE — PATIENT INSTRUCTIONS
"FUTURE APPOINTMENTS  Follow up in 28-31 days.  Call back immediately to the clinic if you cannot  the prescription.    ORAL ISOTRETINOIN INSTRUCTIONS  CURRENT DOSAGE: Take by mouth one 20 mg tablet, two times a day.      Stop all other acne products, except you may use only Cetaphil or CeraVe facial cleanser.    Take the isotretinoin with a fatty meal (such as peanut butter or yogurt) to improve the absorption of the medication.    OFFICE VISIT INSTRUCTIONS    Schedule follow-up appointments every 28-31 days.    Bring iPledge ID# and PASSWORD with you to each office visit. Make sure you have obtained your password before the next office visit.    IF YOU HAVE NOT RECEIVED YOUR PASSWORD IN THE MAIL IN 2 WEEKS, CALL IPLEDGE.    You will need to do a lab blood draw every month:    Schedule blood draw to be completed 1-2 days prior to each office visit  You may complete these at any [facility] lab; just remember to schedule it before you go.      If you are being seen elsewhere by a dermatologist for oral isotretinoin monitoring, be sure to go into iPledge for \"transfer of care\" for the appropriate physician.    Make sure to always  your medication within 3 days of prescription being sent to the pharmacy    Check with your insurance company for coverage of oral isotretinoin therapy for recalcitrant acne. Ask if they have a preferred brand of oral isotretinoin (e.g. Claravis, Myorisan, Zenatane, Amnesteem, Sotret)    RECOMMENDATIONS FOR DRYNESS    Moisturizer : Cetaphil facial moisturizer.    Nasal mist spray : Ocean brand. Use at bedtime.    Do not use clarita-synephrine.    Lips : Aquaphor ointment, Vaseline jelly, or Vanicream lip protectant for the dryness on the lips.    Eye drops : Refresh tears saline eye drops for dry eye symptoms. Consider also use of gel eye drops at bedtime if excessive eye dryness.    Due to dryness of mouth, floss daily and brush teeth at least twice daily.    Consider " supplementation of omega 3 oil 1 gram/day.

## 2019-01-30 ENCOUNTER — TELEPHONE (OUTPATIENT)
Dept: FAMILY MEDICINE | Facility: CLINIC | Age: 24
End: 2019-01-30

## 2019-01-30 DIAGNOSIS — L70.0 ACNE VULGARIS: ICD-10-CM

## 2019-01-30 RX ORDER — ISOTRETINOIN 20 MG/1
20 CAPSULE ORAL 2 TIMES DAILY
Qty: 60 CAPSULE | Refills: 0 | Status: SHIPPED | OUTPATIENT
Start: 2019-01-30 | End: 2019-02-27 | Stop reason: DRUGHIGH

## 2019-01-30 NOTE — TELEPHONE ENCOUNTER
Patient calling, stated he called on Friday 1/25/19 asking to have his acctuane switched to [email or web address] due to insurance coverage and accutane is considered a controlled substance per pharmacy.   No notes were sent.  Please send acctuane RX to [facility], [place], [place]    Thank you  An Le

## 2019-02-27 ENCOUNTER — OFFICE VISIT (OUTPATIENT)
Dept: FAMILY MEDICINE | Facility: CLINIC | Age: 24
End: 2019-02-27
Payer: COMMERCIAL

## 2019-02-27 VITALS — HEART RATE: 87 BPM | DIASTOLIC BLOOD PRESSURE: 78 MMHG | SYSTOLIC BLOOD PRESSURE: 122 MMHG | OXYGEN SATURATION: 99 %

## 2019-02-27 DIAGNOSIS — Z79.899 LONG TERM USE OF ISOTRETINOIN: ICD-10-CM

## 2019-02-27 DIAGNOSIS — L70.0 ACNE VULGARIS: Primary | ICD-10-CM

## 2019-02-27 DIAGNOSIS — Z79.899 ENCOUNTER FOR LONG-TERM (CURRENT) USE OF HIGH-RISK MEDICATION: ICD-10-CM

## 2019-02-27 LAB
BASOPHILS # BLD AUTO: 0 10E9/L (ref 0–0.2)
BASOPHILS NFR BLD AUTO: 0.4 %
DIFFERENTIAL METHOD BLD: NORMAL
EOSINOPHIL # BLD AUTO: 0.1 10E9/L (ref 0–0.7)
EOSINOPHIL NFR BLD AUTO: 1.6 %
ERYTHROCYTE [DISTWIDTH] IN BLOOD BY AUTOMATED COUNT: 12.1 % (ref 10–15)
HCT VFR BLD AUTO: 43.6 % (ref 40–53)
HGB BLD-MCNC: 14.7 G/DL (ref 13.3–17.7)
LYMPHOCYTES # BLD AUTO: 1.7 10E9/L (ref 0.8–5.3)
LYMPHOCYTES NFR BLD AUTO: 30.5 %
MCH RBC QN AUTO: 30.1 PG (ref 26.5–33)
MCHC RBC AUTO-ENTMCNC: 33.7 G/DL (ref 31.5–36.5)
MCV RBC AUTO: 89 FL (ref 78–100)
MONOCYTES # BLD AUTO: 0.6 10E9/L (ref 0–1.3)
MONOCYTES NFR BLD AUTO: 10.3 %
NEUTROPHILS # BLD AUTO: 3.2 10E9/L (ref 1.6–8.3)
NEUTROPHILS NFR BLD AUTO: 57.2 %
PLATELET # BLD AUTO: 167 10E9/L (ref 150–450)
RBC # BLD AUTO: 4.88 10E12/L (ref 4.4–5.9)
WBC # BLD AUTO: 5.5 10E9/L (ref 4–11)

## 2019-02-27 PROCEDURE — 80061 LIPID PANEL: CPT | Performed by: FAMILY MEDICINE

## 2019-02-27 PROCEDURE — 85025 COMPLETE CBC W/AUTO DIFF WBC: CPT | Performed by: FAMILY MEDICINE

## 2019-02-27 PROCEDURE — 99213 OFFICE O/P EST LOW 20 MIN: CPT | Performed by: FAMILY MEDICINE

## 2019-02-27 PROCEDURE — 84450 TRANSFERASE (AST) (SGOT): CPT | Performed by: FAMILY MEDICINE

## 2019-02-27 PROCEDURE — 36415 COLL VENOUS BLD VENIPUNCTURE: CPT | Performed by: FAMILY MEDICINE

## 2019-02-27 RX ORDER — ISOTRETINOIN 20 MG/1
20 CAPSULE ORAL 2 TIMES DAILY
Qty: 60 CAPSULE | Refills: 0 | Status: CANCELLED | OUTPATIENT
Start: 2019-02-27

## 2019-02-27 RX ORDER — ISOTRETINOIN 40 MG/1
40 CAPSULE ORAL 2 TIMES DAILY
Qty: 60 CAPSULE | Refills: 0 | Status: SHIPPED | OUTPATIENT
Start: 2019-02-27 | End: 2019-03-29

## 2019-02-27 RX ORDER — ISOTRETINOIN 30 MG/1
30 CAPSULE ORAL 2 TIMES DAILY
Qty: 60 CAPSULE | Refills: 0 | Status: CANCELLED | OUTPATIENT
Start: 2019-02-27 | End: 2019-03-29

## 2019-02-27 NOTE — LETTER
"    2/27/2019         RE: Juan C Allen  [address]  [place] [address]        Dear Colleague,    Thank you for referring your patient, Juan C Allen, to the [facility] [place]. Please see a copy of my visit note below.    [facility] - PRIMARY CARE SKIN    CC: recalcitrant acne, oral isotretinoin therapy  SUBJECTIVE:   Juan C Allen is a(n) 23 year old male who presents to clinic today for follow-up of severe, recalcitrant acne.    He has had difficulty complying with regular follow-ups, and this fifth course of oral isotretinoin therapy will be the last attempt.     Previous treatments include :   Oral isotretinoin (however, courses of oral isotretinoin with 7-8 week gaps in patient follow-up have been discontinued due to patient non-compliance with follow-up). He previously had insurance issues, but he reported on 5/23/2018 that \"insurance coverage won't be cut off for the rest of the year.\"     Family history of acne: Brother finished a course of oral isotretinoin.    Oral isotretinoin therapy monitoring    Months completed: 1.  Last month dosage: 20 mg BID.    Treatment response over the last month:  Acne on the face has resolved. Acne on the chest and back have diminished.    Side effects noted:    Dryness: Managed with moisturizer use.    Arthralgias/myalgias: Mild joint aches of knees.    Mood changes: None.    Other: None.    PHQ-2 Score:   PHQ-2 ( 1999 Pfizer) 2/27/2019 3/5/2018   Q1: Little interest or pleasure in doing things 0 0   Q2: Feeling down, depressed or hopeless 0 0   PHQ-2 Score 0 0     PHQ-9 Score:  No flowsheet data found.     TitanFileDEGE #: [id number]     Refer to electronic medical record (EMR) for past medical history and medications.    INTEGUMENTARY/SKIN: POSITIVE for acne  ROS: 14 point review of systems was negative except the symptoms listed above in the HPI.    This document serves as a record of the services and decisions personally performed and made by Betsy ZAVALETA" MD Jan and was created by Nico Alfred, a trained medical scribe, based on personal observations and provider statements to the medical scribe.  February 27, 2019 11:52 AM   Nico Alfred    OBJECTIVE:     Wt Readings from Last 2 Encounters:   06/25/16 135 lb (61.2 kg)   11/20/15 119 lb (54 kg) (3 %)*     * Growth percentiles are based on [facility] (Boys, 2-20 Years) data.     GENERAL: healthy, alert and no distress.  SKIN: Soto Skin Type - V.  Face, Neck and Trunk examined. The dermatoscope was used to help evaluate pigmented lesions.  Skin Pertinent Findings:  Face: Some residual post-inflammatory hyperpigmentation.    Chest, Back: Few resolving inflammatory papules    Diagnostic Test Results:  Monitoring CBC, Lipid Profile, AST, (hCG if female).    ASSESSMENT:     Encounter Diagnoses   Name Primary?     Acne vulgaris Yes     Long term use of isotretinoin      Encounter for long-term (current) use of high-risk medication      Comment: severe acne, oral isotretinoin treatment with monthly monitoring.  MDM: Side effects of oral isotretinoin reviewed - dryness of the skin and mucous membranes, arthralgias, myalgias, mood changes. Discussed potential flare of the acne.    PLAN:   Patient Instructions   FUTURE APPOINTMENTS  Please get labs done today.  Follow up in 28-31 days.    ORAL ISOTRETINOIN INSTRUCTIONS  CURRENT DOSAGE: Take by mouth one 40 mg tablet, two times a day.      Stop all other acne products, except you may use only Cetaphil or CeraVe facial cleanser.    Take the isotretinoin with a fatty meal (such as peanut butter or yogurt) to improve the absorption of the medication.    OFFICE VISIT INSTRUCTIONS    Schedule follow-up appointments every 28-31 days.    Bring iPledge ID# and PASSWORD with you to each office visit. Make sure you have obtained your password before the next office visit.    IF YOU HAVE NOT RECEIVED YOUR PASSWORD IN THE MAIL IN 2 WEEKS, CALL IPLEDGE.    You will need to do a lab blood  "draw every month:    Schedule blood draw to be completed 1-2 days prior to each office visit  You may complete these at any [facility] lab; just remember to schedule it before you go.      If you are being seen elsewhere by a dermatologist for oral isotretinoin monitoring, be sure to go into iPledge for \"transfer of care\" for the appropriate physician.    Make sure to always  your medication within 3 days of prescription being sent to the pharmacy    Check with your insurance company for coverage of oral isotretinoin therapy for recalcitrant acne. Ask if they have a preferred brand of oral isotretinoin (e.g. Claravis, Myorisan, Zenatane, Amnesteem, Sotret)    RECOMMENDATIONS FOR DRYNESS    Moisturizer : Cetaphil facial moisturizer.    Nasal mist spray : Ocean brand. Use at bedtime.    Do not use clarita-synephrine.    Lips : Aquaphor ointment, Vaseline jelly, or Vanicream lip protectant for the dryness on the lips.    Eye drops : Refresh tears saline eye drops for dry eye symptoms. Consider also use of gel eye drops at bedtime if excessive eye dryness.    Due to dryness of mouth, floss daily and brush teeth at least twice daily.    Consider supplementation of omega 3 oil 1 gram/day.      When on oral isotretinoin, discontinue all other acne medications. Discussed the importance of sticking with the oral isotretinoin therapy program, not picking or excoriating.    Oral isotretinoin discussed fully with the patient.  Oral isotretinoin is a very effective drug to treat acne vulgaris but has many significant side effects. Chief among these are teratogenesis, hepatic injury, dyslipidemia and severe drying of the mucous membranes. All of these issues have been discussed in detail. Monthly blood tests to monitor lipids and liver functions will be necessary. Expect painful dryness and/or fissuring around the lips, eyes, and other moist areas of the body. Balms may be protective. Contact lenses may be too painful to " wear temporarily while on this drug. Episodes of significant depression have been reported, including suicidal ideation and attempts in rare cases. It may also cause pseudotumor cerebri and hyperostosis. The patient will report any such changes in mood, depressive symptoms or suicidal thoughts, headaches, joint or bone pains.    Female patients MUST use two simultaneous methods of family planning. Accutane is Category X for pregnancy, meaning it will cause fetal teratogenic malformations, and pregnancy MUST be avoided while on this drug. For that reason, the patient is admonished to never share the medication.    The dose is 0.5-1 mg/kg in two divided doses for 15-20 weeks.    After discussion of these important issues, he indicates complete understanding of all of the above, and Does wish to proceed with accutane therapy. Discussed the importance of sticking with the program, not picking or excoriating.    Goal dosage: one month acne free if patient continues to follow regularly.    Dose:  Month #: 2.  Oral isotretinoin dosage: 40 mg po BID.    Previous oral isotretinoin dosing:  Month #1 (prescribed on 1/23/2019): 20 mg po BID * 30 days = 1200 mg.    Total dose to date for this course: 1200 mg.    RTC in one month for follow up, or sooner prn, with laboratory studies completed.    TT: 20 minutes.  CT: 15 minutes.    The information in this document, created by the medical scribe for me, accurately reflects the services I personally performed and the decisions made by me. I have reviewed and approved this document for accuracy prior to leaving the patient care area.  February 27, 2019 11:52 AM  Betsy Montoya MD  [facility]    Again, thank you for allowing me to participate in the care of your patient.        Sincerely,        Betsy Montoya MD

## 2019-02-27 NOTE — PATIENT INSTRUCTIONS
"FUTURE APPOINTMENTS  Please get labs done today.  Follow up in 28-31 days.    ORAL ISOTRETINOIN INSTRUCTIONS  CURRENT DOSAGE: Take by mouth one 40 mg tablet, two times a day.      Stop all other acne products, except you may use only Cetaphil or CeraVe facial cleanser.    Take the isotretinoin with a fatty meal (such as peanut butter or yogurt) to improve the absorption of the medication.    OFFICE VISIT INSTRUCTIONS    Schedule follow-up appointments every 28-31 days.    Bring iPledge ID# and PASSWORD with you to each office visit. Make sure you have obtained your password before the next office visit.    IF YOU HAVE NOT RECEIVED YOUR PASSWORD IN THE MAIL IN 2 WEEKS, CALL IPLEDGE.    You will need to do a lab blood draw every month:    Schedule blood draw to be completed 1-2 days prior to each office visit  You may complete these at any [facility] lab; just remember to schedule it before you go.      If you are being seen elsewhere by a dermatologist for oral isotretinoin monitoring, be sure to go into iPledge for \"transfer of care\" for the appropriate physician.    Make sure to always  your medication within 3 days of prescription being sent to the pharmacy    Check with your insurance company for coverage of oral isotretinoin therapy for recalcitrant acne. Ask if they have a preferred brand of oral isotretinoin (e.g. Claravis, Myorisan, Zenatane, Amnesteem, Sotret)    RECOMMENDATIONS FOR DRYNESS    Moisturizer : Cetaphil facial moisturizer.    Nasal mist spray : Ocean brand. Use at bedtime.    Do not use clarita-synephrine.    Lips : Aquaphor ointment, Vaseline jelly, or Vanicream lip protectant for the dryness on the lips.    Eye drops : Refresh tears saline eye drops for dry eye symptoms. Consider also use of gel eye drops at bedtime if excessive eye dryness.    Due to dryness of mouth, floss daily and brush teeth at least twice daily.    Consider supplementation of omega 3 oil 1 gram/day.    "

## 2019-02-27 NOTE — PROGRESS NOTES
"[facility] - PRIMARY CARE SKIN    CC: recalcitrant acne, oral isotretinoin therapy  SUBJECTIVE:   Juan C Allen is a(n) 23 year old male who presents to clinic today for follow-up of severe, recalcitrant acne.    He has had difficulty complying with regular follow-ups, and this fifth course of oral isotretinoin therapy will be the last attempt.     Previous treatments include :   Oral isotretinoin (however, courses of oral isotretinoin with 7-8 week gaps in patient follow-up have been discontinued due to patient non-compliance with follow-up). He previously had insurance issues, but he reported on 5/23/2018 that \"insurance coverage won't be cut off for the rest of the year.\"     Family history of acne: Brother finished a course of oral isotretinoin.    Oral isotretinoin therapy monitoring    Months completed: 1.  Last month dosage: 20 mg BID.    Treatment response over the last month:  Acne on the face has resolved. Acne on the chest and back have diminished.    Side effects noted:    Dryness: Managed with moisturizer use.    Arthralgias/myalgias: Mild joint aches of knees.    Mood changes: None.    Other: None.    PHQ-2 Score:   PHQ-2 ( 1999 Pfizer) 2/27/2019 3/5/2018   Q1: Little interest or pleasure in doing things 0 0   Q2: Feeling down, depressed or hopeless 0 0   PHQ-2 Score 0 0     PHQ-9 Score:  No flowsheet data found.     iPLDEGE #: [id number]     Refer to electronic medical record (EMR) for past medical history and medications.    INTEGUMENTARY/SKIN: POSITIVE for acne  ROS: 14 point review of systems was negative except the symptoms listed above in the HPI.    This document serves as a record of the services and decisions personally performed and made by Betsy Montoya MD and was created by Nico Alfred, a trained medical scribe, based on personal observations and provider statements to the medical scribe.  February 27, 2019 11:52 AM   Nico Alfred    OBJECTIVE:     Wt Readings from Last 2 Encounters: "   06/25/16 135 lb (61.2 kg)   11/20/15 119 lb (54 kg) (3 %)*     * Growth percentiles are based on CDC (Boys, 2-20 Years) data.     GENERAL: healthy, alert and no distress.  SKIN: Soto Skin Type - V.  Face, Neck and Trunk examined. The dermatoscope was used to help evaluate pigmented lesions.  Skin Pertinent Findings:  Face: Some residual post-inflammatory hyperpigmentation.    Chest, Back: Few resolving inflammatory papules    Diagnostic Test Results:  Monitoring CBC, Lipid Profile, AST, (hCG if female).    ASSESSMENT:     Encounter Diagnoses   Name Primary?     Acne vulgaris Yes     Long term use of isotretinoin      Encounter for long-term (current) use of high-risk medication      Comment: severe acne, oral isotretinoin treatment with monthly monitoring.  MDM: Side effects of oral isotretinoin reviewed - dryness of the skin and mucous membranes, arthralgias, myalgias, mood changes. Discussed potential flare of the acne.    PLAN:   Patient Instructions   FUTURE APPOINTMENTS  Please get labs done today.  Follow up in 28-31 days.    ORAL ISOTRETINOIN INSTRUCTIONS  CURRENT DOSAGE: Take by mouth one 40 mg tablet, two times a day.      Stop all other acne products, except you may use only Cetaphil or CeraVe facial cleanser.    Take the isotretinoin with a fatty meal (such as peanut butter or yogurt) to improve the absorption of the medication.    OFFICE VISIT INSTRUCTIONS    Schedule follow-up appointments every 28-31 days.    Bring iPledge ID# and PASSWORD with you to each office visit. Make sure you have obtained your password before the next office visit.    IF YOU HAVE NOT RECEIVED YOUR PASSWORD IN THE MAIL IN 2 WEEKS, CALL IPLEDGE.    You will need to do a lab blood draw every month:    Schedule blood draw to be completed 1-2 days prior to each office visit  You may complete these at any [facility] lab; just remember to schedule it before you go.      If you are being seen elsewhere by a dermatologist  "for oral isotretinoin monitoring, be sure to go into iPledge for \"transfer of care\" for the appropriate physician.    Make sure to always  your medication within 3 days of prescription being sent to the pharmacy    Check with your insurance company for coverage of oral isotretinoin therapy for recalcitrant acne. Ask if they have a preferred brand of oral isotretinoin (e.g. Claravis, Myorisan, Zenatane, Amnesteem, Sotret)    RECOMMENDATIONS FOR DRYNESS    Moisturizer : Cetaphil facial moisturizer.    Nasal mist spray : Ocean brand. Use at bedtime.    Do not use clarita-synephrine.    Lips : Aquaphor ointment, Vaseline jelly, or Vanicream lip protectant for the dryness on the lips.    Eye drops : Refresh tears saline eye drops for dry eye symptoms. Consider also use of gel eye drops at bedtime if excessive eye dryness.    Due to dryness of mouth, floss daily and brush teeth at least twice daily.    Consider supplementation of omega 3 oil 1 gram/day.      When on oral isotretinoin, discontinue all other acne medications. Discussed the importance of sticking with the oral isotretinoin therapy program, not picking or excoriating.    Oral isotretinoin discussed fully with the patient.  Oral isotretinoin is a very effective drug to treat acne vulgaris but has many significant side effects. Chief among these are teratogenesis, hepatic injury, dyslipidemia and severe drying of the mucous membranes. All of these issues have been discussed in detail. Monthly blood tests to monitor lipids and liver functions will be necessary. Expect painful dryness and/or fissuring around the lips, eyes, and other moist areas of the body. Balms may be protective. Contact lenses may be too painful to wear temporarily while on this drug. Episodes of significant depression have been reported, including suicidal ideation and attempts in rare cases. It may also cause pseudotumor cerebri and hyperostosis. The patient will report any such changes " in mood, depressive symptoms or suicidal thoughts, headaches, joint or bone pains.    Female patients MUST use two simultaneous methods of family planning. Accutane is Category X for pregnancy, meaning it will cause fetal teratogenic malformations, and pregnancy MUST be avoided while on this drug. For that reason, the patient is admonished to never share the medication.    The dose is 0.5-1 mg/kg in two divided doses for 15-20 weeks.    After discussion of these important issues, he indicates complete understanding of all of the above, and Does wish to proceed with accutane therapy. Discussed the importance of sticking with the program, not picking or excoriating.    Goal dosage: one month acne free if patient continues to follow regularly.    Dose:  Month #: 2.  Oral isotretinoin dosage: 40 mg po BID.    Previous oral isotretinoin dosing:  Month #1 (prescribed on 1/23/2019): 20 mg po BID * 30 days = 1200 mg.    Total dose to date for this course: 1200 mg.    RTC in one month for follow up, or sooner prn, with laboratory studies completed.    TT: 20 minutes.  CT: 15 minutes.    The information in this document, created by the medical scribe for me, accurately reflects the services I personally performed and the decisions made by me. I have reviewed and approved this document for accuracy prior to leaving the patient care area.  February 27, 2019 11:52 AM  Betsy Montoya MD  [facility]

## 2019-02-28 ENCOUNTER — TELEPHONE (OUTPATIENT)
Dept: FAMILY MEDICINE | Facility: CLINIC | Age: 24
End: 2019-02-28

## 2019-02-28 LAB
AST SERPL W P-5'-P-CCNC: 28 U/L (ref 0–45)
CHOLEST SERPL-MCNC: 170 MG/DL
HDLC SERPL-MCNC: 52 MG/DL
LDLC SERPL CALC-MCNC: 93 MG/DL
NONHDLC SERPL-MCNC: 118 MG/DL
TRIGL SERPL-MCNC: 127 MG/DL

## 2019-02-28 NOTE — LETTER
February 28, 2019    Juan C Allen  [address]  [place] [address]        Dear Juan C,    This is a letter regarding your completed lab results. The tested values are below and were normal.    Office Visit on 02/27/2019   Component Date Value Ref Range Status     Cholesterol 02/27/2019 170  <200 mg/dL Final     Triglycerides 02/27/2019 127  <150 mg/dL Final     HDL Cholesterol 02/27/2019 52  >39 mg/dL Final     LDL Cholesterol Calculated 02/27/2019 93  <100 mg/dL Final     Non HDL Cholesterol 02/27/2019 118  <130 mg/dL Final     WBC 02/27/2019 5.5  4.0 - 11.0 10e9/L Final     RBC Count 02/27/2019 4.88  4.4 - 5.9 10e12/L Final     Hemoglobin 02/27/2019 14.7  13.3 - 17.7 g/dL Final     Hematocrit 02/27/2019 43.6  40.0 - 53.0 % Final     MCV 02/27/2019 89  78 - 100 fl Final     MCH 02/27/2019 30.1  26.5 - 33.0 pg Final     MCHC 02/27/2019 33.7  31.5 - 36.5 g/dL Final     RDW 02/27/2019 12.1  10.0 - 15.0 % Final     Platelet Count 02/27/2019 167  150 - 450 10e9/L Final     % Neutrophils 02/27/2019 57.2  % Final     % Lymphocytes 02/27/2019 30.5  % Final     % Monocytes 02/27/2019 10.3  % Final     % Eosinophils 02/27/2019 1.6  % Final     % Basophils 02/27/2019 0.4  % Final     Absolute Neutrophil 02/27/2019 3.2  1.6 - 8.3 10e9/L Final     Absolute Lymphocytes 02/27/2019 1.7  0.8 - 5.3 10e9/L Final     Absolute Monocytes 02/27/2019 0.6  0.0 - 1.3 10e9/L Final     Absolute Eosinophils 02/27/2019 0.1  0.0 - 0.7 10e9/L Final     Absolute Basophils 02/27/2019 0.0  0.0 - 0.2 10e9/L Final     Diff Method 02/27/2019 Automated Method   Final     AST 02/27/2019 28  0 - 45 U/L Final         Thank you for allowing me to be involved in your health care and for choosing [facility].  If you have any questions or concerns please feel free to contact me at [phone].      Sincerely,      Betsy Montoya M.D.

## 2019-02-28 NOTE — TELEPHONE ENCOUNTER
Letter sent  Reina DolanRN BSN  [facility]  [phone]      Notes recorded by Betsy Montoya MD on 2/28/2019 at 1:23 PM CST  Please call the patient and let them know that the lab work was normal.   Thank you, Betsy Montoya M.D.

## 2019-03-11 ENCOUNTER — TELEPHONE (OUTPATIENT)
Dept: FAMILY MEDICINE | Facility: CLINIC | Age: 24
End: 2019-03-11

## 2019-03-11 NOTE — TELEPHONE ENCOUNTER
Rcvd call from [employer] on [address] stating they tried to submit rx to [employer], but the website states the prescriber need to complete something for the patient before this can be filled.  They can be reached at [phone].  Please advise.  Michelle Fischer

## 2019-03-11 NOTE — TELEPHONE ENCOUNTER
Called and notified pharmacist that he was confirmed again- she ran the prescription without problem.    Reina DolanRN BSN  [facility]  [phone]

## 2019-09-07 ENCOUNTER — HOSPITAL ENCOUNTER (EMERGENCY)
Facility: CLINIC | Age: 24
Discharge: HOME | End: 2019-09-07
Attending: EMERGENCY MEDICINE | Admitting: EMERGENCY MEDICINE
Payer: COMMERCIAL

## 2019-09-07 VITALS
HEART RATE: 80 BPM | OXYGEN SATURATION: 97 % | SYSTOLIC BLOOD PRESSURE: 115 MMHG | DIASTOLIC BLOOD PRESSURE: 75 MMHG | RESPIRATION RATE: 21 BRPM | TEMPERATURE: 98.5 F

## 2019-09-07 DIAGNOSIS — F19.10 DRUG ABUSE (H): ICD-10-CM

## 2019-09-07 DIAGNOSIS — T40.601A OPIATE OVERDOSE, ACCIDENTAL OR UNINTENTIONAL, INITIAL ENCOUNTER (H): ICD-10-CM

## 2019-09-07 PROCEDURE — 99283 EMERGENCY DEPT VISIT LOW MDM: CPT

## 2019-09-07 NOTE — ED NOTES
Patient awake and alert, VSS, no pain, is feeling much better and is ready for discharge. Reviewed care plan.  Poison control wanted a 4 hour stay.  MD notified and will reassess for discharge.

## 2019-09-07 NOTE — ED AVS SNAPSHOT
Emergency Department  [address] [place] [id number]  Phone:  [phone]  Fax:  [phone]                                    Juan C Allen   MRN: [id number]    Department:   Emergency Department   Date of Visit:  9/7/2019           After Visit Summary Signature Page    I have received my discharge instructions, and my questions have been answered. I have discussed any challenges I see with this plan with the nurse or doctor.    ..........................................................................................................................................  Patient/Patient Representative Signature      ..........................................................................................................................................  Patient Representative Print Name and Relationship to Patient    ..................................................               ................................................  Date                                   Time    ..........................................................................................................................................  Reviewed by Signature/Title    ...................................................              ..............................................  Date                                               Time          22EPIC Rev 08/18

## 2019-09-07 NOTE — ED NOTES
Bed: [facility]  Expected date:   Expected time:   Means of arrival:   Comments:  valdo - 24 M overdose restrained eta 1140

## 2019-09-07 NOTE — ED NOTES
"Patient awake. Eating meal. Sitting up in bed. States he is \"ready to go home.\" Patient removed nasal cannula due to discomfort.   "

## 2019-09-07 NOTE — ED NOTES
Pt to be monitored x 4 hours per poison control. Will be able to discharge home after if remains stable.

## 2019-09-07 NOTE — ED PROVIDER NOTES
History     Chief Complaint:  Drug Overdose    HPI    Juan C Allen is a 24 year old male who presents to the ED for the evaluation of a drug overdose. The patient states that he and his friend snorted 30 mg of what they thought was percocet. Per EMS, they were found on [address] in a car in the middle of the street. The patient and his friend were both unresponsive and barely breathing. The patient states that he thinks that what he took was Fetanyl. He reports that he has never overdosed in his life and that he snorted the drugs today due to peer pressure. He lat took percocet in summer 2018.     Allergies:  No Known Drug Allergies    Medications:    Medications reviewed. No current medications.     Past Medical History:    Acne  Attention and concentration deficit    Past Surgical History:    Surgical history reviewed. No pertinent surgical history.    Family History:    Family history reviewed. No pertinent family history.      Social History:  Smoking Status: Never Smoker  Smokeless Tobacco: Never Used  Alcohol Use: Negative  Drug Use: Negative  Marital Status:  Single    Review of Systems   Psychiatric/Behavioral:        Drug overdose   10 point review of systems performed and is negative except as above and in HPI.       Physical Exam     Patient Vitals for the past 24 hrs:   BP Temp Temp src Pulse Heart Rate Resp SpO2   09/07/19 1345 110/83 -- -- 86 90 12 --   09/07/19 1330 103/64 -- -- 85 81 9 97 %   09/07/19 1300 103/65 -- -- 81 77 8 97 %   09/07/19 1245 107/65 -- -- 77 86 10 97 %   09/07/19 1230 109/68 -- -- 77 81 11 97 %   09/07/19 1215 110/71 -- -- 78 -- -- 100 %   09/07/19 1202 (!) 122/92 98.5  F (36.9  C) Oral 88 -- 18 100 %      Physical Exam  General: Resting on gurney. Minimally sleepy but rouses easily. Alert, appropriate, interactive. No thoughts of harming self.  Head: No signs of trauma.   Mouth/Throat: Oropharynx moist.   Eyes: Conjunctivae are normal. Pupils are equal..   Neck: Normal range  "of motion.    Resp:No respiratory distress. Normal respiratory rate.  MSK: Normal range of motion. No obvious deformity.  Neuro: The patient is alert and interactive. PARKS. Speech normal. GCS 15  Skin: No lesion or sign of trauma noted.   Psych: normal mood and affect. behavior is normal.      Emergency Department Course     Emergency Department Course:    1155 Nursing notes and vitals reviewed.    1157 I performed an exam of the patient as documented above.     Impression & Plan      Medical Decision Making:  Juan C Allen is a 24 year old male who presents to the Emergency Department for evaluation after snorting percocet. Neurologically, the patient is awake and alert without deficit. He states that was \"peer pressured\" to take the drugs and that he doesn't frequently use percocet. Poison control was contacted and aware; they agree with my management and plan at this time.  Plan is 4 hour obs as he received Narcan and discharge if no further medication needed.    Diagnosis:    ICD-10-CM    1. Opiate overdose, accidental or unintentional, initial encounter (H) T40.601A    2. Drug abuse (H) F19.10      Disposition:   The patient is discharged to home.     Discharge Medications:     START taking      Dose / Directions   naloxone 4 MG/0.1ML nasal spray  Commonly known as:  NARCAN      Dose:  4 mg  Spray 1 spray (4 mg) into one nostril alternating nostrils as needed for opioid reversal every 2-3 minutes until assistance arrives  Quantity:  0.2 mL  Refills:  0           Where to get your medicines      Some of these will need a paper prescription and others can be bought over the counter. Ask your nurse if you have questions.    Bring a paper prescription for each of these medications    naloxone 4 MG/0.1ML nasal spray       Scribe Disclosure:  Mervin KENNEDY, am serving as a scribe at 12:09 PM on 9/7/2019 to document services personally performed by Giselle Joiner MD based on my observations and the provider's " statements to me.   EMERGENCY DEPARTMENT       Giselle Joiner MD  09/08/19 4082

## 2019-09-07 NOTE — ED TRIAGE NOTES
"He and a raphael crushed and snorted \"percocet\" approx 30 mg. Was found unresponsive in car in middle of street. Barely breathing. Given 0.5 mg narcan, woke up and is appropriate. \"think it was laced with fentanyl\"    "

## 2020-06-26 ENCOUNTER — HOSPITAL ENCOUNTER (INPATIENT)
Facility: CLINIC | Age: 25
LOS: 12 days | Discharge: HOME | DRG: 885 | End: 2020-07-08
Attending: PSYCHIATRY & NEUROLOGY | Admitting: PSYCHIATRY & NEUROLOGY
Payer: COMMERCIAL

## 2020-06-26 ENCOUNTER — TELEPHONE (OUTPATIENT)
Dept: BEHAVIORAL HEALTH | Facility: CLINIC | Age: 25
End: 2020-06-26

## 2020-06-26 ENCOUNTER — HOSPITAL ENCOUNTER (EMERGENCY)
Facility: CLINIC | Age: 25
Discharge: HOME | End: 2020-06-26
Attending: EMERGENCY MEDICINE | Admitting: EMERGENCY MEDICINE
Payer: COMMERCIAL

## 2020-06-26 VITALS
OXYGEN SATURATION: 90 % | DIASTOLIC BLOOD PRESSURE: 77 MMHG | HEART RATE: 75 BPM | SYSTOLIC BLOOD PRESSURE: 120 MMHG | RESPIRATION RATE: 13 BRPM

## 2020-06-26 DIAGNOSIS — T14.91XA SUICIDAL BEHAVIOR WITH ATTEMPTED SELF-INJURY (H): ICD-10-CM

## 2020-06-26 DIAGNOSIS — F11.20 UNCOMPLICATED OPIOID DEPENDENCE (H): Primary | ICD-10-CM

## 2020-06-26 DIAGNOSIS — T50.904A DRUG OVERDOSE, UNDETERMINED INTENT, INITIAL ENCOUNTER: ICD-10-CM

## 2020-06-26 DIAGNOSIS — F33.1 MODERATE EPISODE OF RECURRENT MAJOR DEPRESSIVE DISORDER (H): ICD-10-CM

## 2020-06-26 PROBLEM — F32.A DEPRESSION: Status: ACTIVE | Noted: 2020-06-26

## 2020-06-26 LAB
ALBUMIN SERPL-MCNC: 3.9 G/DL (ref 3.4–5)
ALP SERPL-CCNC: 94 U/L (ref 40–150)
ALT SERPL W P-5'-P-CCNC: 43 U/L (ref 0–70)
AMPHETAMINES UR QL SCN: NEGATIVE
ANION GAP SERPL CALCULATED.3IONS-SCNC: 3 MMOL/L (ref 3–14)
APAP SERPL-MCNC: <2 MG/L (ref 10–20)
AST SERPL W P-5'-P-CCNC: 46 U/L (ref 0–45)
BARBITURATES UR QL: NEGATIVE
BASOPHILS # BLD AUTO: 0 10E9/L (ref 0–0.2)
BASOPHILS NFR BLD AUTO: 0.1 %
BENZODIAZ UR QL: NEGATIVE
BILIRUB SERPL-MCNC: 0.5 MG/DL (ref 0.2–1.3)
BUN SERPL-MCNC: 13 MG/DL (ref 7–30)
CALCIUM SERPL-MCNC: 8.7 MG/DL (ref 8.5–10.1)
CANNABINOIDS UR QL SCN: POSITIVE
CHLORIDE SERPL-SCNC: 104 MMOL/L (ref 94–109)
CO2 SERPL-SCNC: 31 MMOL/L (ref 20–32)
COCAINE UR QL: NEGATIVE
CREAT SERPL-MCNC: 0.84 MG/DL (ref 0.66–1.25)
DIFFERENTIAL METHOD BLD: ABNORMAL
EOSINOPHIL # BLD AUTO: 0.1 10E9/L (ref 0–0.7)
EOSINOPHIL NFR BLD AUTO: 1.8 %
ERYTHROCYTE [DISTWIDTH] IN BLOOD BY AUTOMATED COUNT: 11.9 % (ref 10–15)
ETHANOL SERPL-MCNC: <0.01 G/DL
GFR SERPL CREATININE-BSD FRML MDRD: >90 ML/MIN/{1.73_M2}
GLUCOSE SERPL-MCNC: 92 MG/DL (ref 70–99)
HCT VFR BLD AUTO: 38 % (ref 40–53)
HGB BLD-MCNC: 13 G/DL (ref 13.3–17.7)
IMM GRANULOCYTES # BLD: 0 10E9/L (ref 0–0.4)
IMM GRANULOCYTES NFR BLD: 0.1 %
LYMPHOCYTES # BLD AUTO: 1.8 10E9/L (ref 0.8–5.3)
LYMPHOCYTES NFR BLD AUTO: 26.5 %
MCH RBC QN AUTO: 29.1 PG (ref 26.5–33)
MCHC RBC AUTO-ENTMCNC: 34.2 G/DL (ref 31.5–36.5)
MCV RBC AUTO: 85 FL (ref 78–100)
MONOCYTES # BLD AUTO: 0.7 10E9/L (ref 0–1.3)
MONOCYTES NFR BLD AUTO: 10.3 %
NEUTROPHILS # BLD AUTO: 4.2 10E9/L (ref 1.6–8.3)
NEUTROPHILS NFR BLD AUTO: 61.2 %
NRBC # BLD AUTO: 0 10*3/UL
NRBC BLD AUTO-RTO: 0 /100
OPIATES UR QL SCN: NEGATIVE
PCP UR QL SCN: NEGATIVE
PLATELET # BLD AUTO: 155 10E9/L (ref 150–450)
POTASSIUM SERPL-SCNC: 3.3 MMOL/L (ref 3.4–5.3)
PROT SERPL-MCNC: 7.1 G/DL (ref 6.8–8.8)
RBC # BLD AUTO: 4.46 10E12/L (ref 4.4–5.9)
SALICYLATES SERPL-MCNC: <2 MG/DL
SARS-COV-2 PCR COMMENT: NORMAL
SARS-COV-2 RNA SPEC QL NAA+PROBE: NEGATIVE
SARS-COV-2 RNA SPEC QL NAA+PROBE: NORMAL
SODIUM SERPL-SCNC: 138 MMOL/L (ref 133–144)
SPECIMEN SOURCE: NORMAL
SPECIMEN SOURCE: NORMAL
WBC # BLD AUTO: 6.8 10E9/L (ref 4–11)

## 2020-06-26 PROCEDURE — 96361 HYDRATE IV INFUSION ADD-ON: CPT

## 2020-06-26 PROCEDURE — 93005 ELECTROCARDIOGRAM TRACING: CPT

## 2020-06-26 PROCEDURE — 96360 HYDRATION IV INFUSION INIT: CPT

## 2020-06-26 PROCEDURE — 25800030 ZZH RX IP 258 OP 636: Performed by: EMERGENCY MEDICINE

## 2020-06-26 PROCEDURE — 80053 COMPREHEN METABOLIC PANEL: CPT | Performed by: EMERGENCY MEDICINE

## 2020-06-26 PROCEDURE — C9803 HOPD COVID-19 SPEC COLLECT: HCPCS

## 2020-06-26 PROCEDURE — 80307 DRUG TEST PRSMV CHEM ANLYZR: CPT | Performed by: EMERGENCY MEDICINE

## 2020-06-26 PROCEDURE — 85025 COMPLETE CBC W/AUTO DIFF WBC: CPT | Performed by: EMERGENCY MEDICINE

## 2020-06-26 PROCEDURE — 12400001 ZZH R&B MH UMMC

## 2020-06-26 PROCEDURE — U0003 INFECTIOUS AGENT DETECTION BY NUCLEIC ACID (DNA OR RNA); SEVERE ACUTE RESPIRATORY SYNDROME CORONAVIRUS 2 (SARS-COV-2) (CORONAVIRUS DISEASE [COVID-19]), AMPLIFIED PROBE TECHNIQUE, MAKING USE OF HIGH THROUGHPUT TECHNOLOGIES AS DESCRIBED BY CMS-2020-01-R: HCPCS | Performed by: EMERGENCY MEDICINE

## 2020-06-26 PROCEDURE — 90791 PSYCH DIAGNOSTIC EVALUATION: CPT

## 2020-06-26 PROCEDURE — 80329 ANALGESICS NON-OPIOID 1 OR 2: CPT | Performed by: EMERGENCY MEDICINE

## 2020-06-26 PROCEDURE — 80320 DRUG SCREEN QUANTALCOHOLS: CPT | Performed by: EMERGENCY MEDICINE

## 2020-06-26 PROCEDURE — 99285 EMERGENCY DEPT VISIT HI MDM: CPT | Mod: 25

## 2020-06-26 RX ORDER — HYDROXYZINE HYDROCHLORIDE 25 MG/1
25-50 TABLET, FILM COATED ORAL EVERY 4 HOURS PRN
Status: DISCONTINUED | OUTPATIENT
Start: 2020-06-26 | End: 2020-07-08 | Stop reason: HOSPADM

## 2020-06-26 RX ORDER — BUPRENORPHINE HYDROCHLORIDE AND NALOXONE HYDROCHLORIDE DIHYDRATE 8; 2 MG/1; MG/1
2 TABLET SUBLINGUAL DAILY
Status: ON HOLD | COMMUNITY
End: 2020-07-08

## 2020-06-26 RX ORDER — ACETAMINOPHEN 325 MG/1
650 TABLET ORAL EVERY 4 HOURS PRN
Status: DISCONTINUED | OUTPATIENT
Start: 2020-06-26 | End: 2020-07-08 | Stop reason: HOSPADM

## 2020-06-26 RX ORDER — ALUMINA, MAGNESIA, AND SIMETHICONE 2400; 2400; 240 MG/30ML; MG/30ML; MG/30ML
30 SUSPENSION ORAL EVERY 4 HOURS PRN
Status: DISCONTINUED | OUTPATIENT
Start: 2020-06-26 | End: 2020-07-08 | Stop reason: HOSPADM

## 2020-06-26 RX ORDER — OLANZAPINE 10 MG/2ML
10 INJECTION, POWDER, FOR SOLUTION INTRAMUSCULAR
Status: DISCONTINUED | OUTPATIENT
Start: 2020-06-26 | End: 2020-07-08 | Stop reason: HOSPADM

## 2020-06-26 RX ORDER — BISACODYL 10 MG
10 SUPPOSITORY, RECTAL RECTAL DAILY PRN
Status: DISCONTINUED | OUTPATIENT
Start: 2020-06-26 | End: 2020-07-08 | Stop reason: HOSPADM

## 2020-06-26 RX ORDER — TRAZODONE HYDROCHLORIDE 50 MG/1
50 TABLET, FILM COATED ORAL
Status: DISCONTINUED | OUTPATIENT
Start: 2020-06-26 | End: 2020-07-08 | Stop reason: HOSPADM

## 2020-06-26 RX ORDER — OLANZAPINE 10 MG/1
10 TABLET ORAL
Status: DISCONTINUED | OUTPATIENT
Start: 2020-06-26 | End: 2020-07-08 | Stop reason: HOSPADM

## 2020-06-26 RX ADMIN — SODIUM CHLORIDE 1000 ML: 9 INJECTION, SOLUTION INTRAVENOUS at 03:53

## 2020-06-26 ASSESSMENT — ACTIVITIES OF DAILY LIVING (ADL)
RETIRED_EATING: 1-->ASSISTIVE EQUIPMENT
TOILETING: 1-->ASSISTIVE EQUIPMENT
FALL_HISTORY_WITHIN_LAST_SIX_MONTHS: NO
SWALLOWING: 2-->DIFFICULTY SWALLOWING LIQUIDS
RETIRED_COMMUNICATION: 2-->DIFFICULTY UNDERSTANDING (NOT RELATED TO LANGUAGE BARRIER)
TRANSFERRING: 0-->INDEPENDENT
DRESS: 1-->ASSISTIVE EQUIPMENT
AMBULATION: 0-->INDEPENDENT
COGNITION: 0 - NO COGNITION ISSUES REPORTED
BATHING: 1-->ASSISTIVE EQUIPMENT

## 2020-06-26 ASSESSMENT — MIFFLIN-ST. JEOR: SCORE: 1567.13

## 2020-06-26 NOTE — ED TRIAGE NOTES
PD well aware of this patient r/t drug intox.  Recently released from custodial.  Told his brother he took several bars of xanax, and told EMS he took 2 bars of xanax.  Ripping IV out and taking seatbelts off en route.

## 2020-06-26 NOTE — PROGRESS NOTES
06/26/20 1601   Patient Belongings   Did you bring any home meds/supplements to the hospital?  No   Patient Belongings locker   Patient Belongings Put in Hospital Secure Location (Security or Locker, etc.) clothing;other (see comments)   Belongings Search Yes   Clothing Search Yes   Second Staff TASHI Kurtz, Kiet BONILLA RN     In Pt Bin:  Black colored sweat shirt with strings  Gray colored sweater  Multicolored socks  Black colored sweat pants with strings  Black colored sweat pants  Black colored shorts with strings  Gold colored shirt  Gray colored shirt    NOTHING IN SECURITY UPON ADMISSION, NO WALLET OR CELL PHONE    A               Admission:  I am responsible for any personal items that are not sent to the safe or pharmacy.  [facility] is not responsible for loss, theft or damage of any property in my possession.    Signature:  _________________________________ Date: _______  Time: _____                                              Staff Signature:  ____________________________ Date: ________  Time: _____      2nd Staff person, if patient is unable/unwilling to sign:    Signature: ________________________________ Date: ________  Time: _____     Discharge:  [facility] has returned all of my personal belongings:    Signature: _________________________________ Date: ________  Time: _____                                          Staff Signature:  ____________________________ Date: ________  Time: _____

## 2020-06-26 NOTE — ED NOTES
Pt arrived via EMS, restrained. Enroute was attempting to take seatbelts off. Restraints continued on arrival. Pt. Unable to follow commands.

## 2020-06-26 NOTE — TELEPHONE ENCOUNTER
"S:  10:15 AM  DEC  from  [facility] ED seeking placement for a 26 YO M s/p SA     B:  Pt  BIB EMS approx. 7 hours ago after his brother called 911 reporting patient had intentionally took several bars of xanax  and Fentynl. Per patient's mother, he was recently released from [facility], his twin brother  of an overdose 1 year ago and another brother was murdered on 20.  Patient  rarely opened  his eyes during DEC, withdrawn, flat affect, stating \"I don't care, I just want to die\"            Labs:  Salicylate-<2  Acetaminophen- <2  CMP-Potassium  3.3(L)           AST 46(H)  CBC   Hgb-13.0(L)              HCT-38.0(L)  Ethanol-NEG  Utox-POS for cannabinoids  COVID-19-in process      A: 72 hour hold placed    R:  Patient cleared and ready for behavioral bed placement: Yes    R:  11 AM  Paged Renetta Miller to present for admission to       R:  12 PM  Paged Renetta Miller     R:  12:40 PM  Renetta Miller/Dr. Braxton accepted for station 4A admission.  Placed in 4A queue  Called -disposition given-[facility] called-clarified that patient will need 72 hour hold placed prior to transferring to .  Number given for nurse to nurse-patient can transfer as soon as hold placed.     "

## 2020-06-26 NOTE — ED PROVIDER NOTES
"  History   Chief Complaint:  Drug Overdose    The history is provided by the EMS personnel and the patient. The history is limited by the condition of the patient.      Juan C Allen is a 25 year old male with history of substance abuse on suboxone who presents via EMS for evaluation after a possible drug overdose. EMS reports the patient was recently release from shelter. This evening the patient told his brother he took \"several bars of Xanax\", so his brother call EMS for evaluation. On EMS arrival, the patient stated he took only \"two bars\" of Xanax.     En route, the patient ripped out his IV and was taking off the seatbelt.     Allergies:  No Known Drug Allergies     Medications:   Narcan   Suboxone     Past Medical History:    Seizure disorder   Substance abuse    Past Surgical History:    History reviewed. No pertinent past surgical history.     Family History:    The patient denies any relevant family medical history.     Social History:  The patient was accompanied to the ED by EMS.  Smoking Status: Never Smoker  Smokeless Tobacco: Never Used  Alcohol Use: Yes  Drug Use: Yes    Review of Systems   Unable to perform ROS: Mental status change     Physical Exam     Patient Vitals for the past 24 hrs:   BP Pulse Heart Rate Resp SpO2   06/26/20 0430 115/64 91 109 18 97 %   06/26/20 0400 138/79 100 125 -- 100 %   06/26/20 0330 (!) 142/56 115 115 -- 98 %   06/26/20 0328 136/65 -- 114 20 100 %     Physical Exam  Vitals: reviewed by me  General: Pt seen on [facility], in restraints, mumbling incomprehensibly, although airway is patent.  Eyes: Tracking well, clear conjunctiva BL  ENT: MMM, midline trachea.   Lungs:  No tachypnea, no accessory muscle use. No respiratory distress.   CV: Rate as above, regular rhythm.    MSK: no peripheral edema or joint effusion.  No evidence of trauma  Skin: No rash, normal turgor and temperature  Neuro: Clear speech and no facial droop.  Psych: Deferred    Emergency Department " Course     ECG:  ECG taken at 0348, ECG read at 0350 by Pasha Gallego MD  Normal sinus rhythm  Normal ECG  Rate 96 bpm. HI interval 154. QRS duration 84. QT/QTc 354/447. P-R-T axes 69 86 51.      Laboratory:  Laboratory findings were communicated with the patient who voiced understanding of the findings.    CBC: HGB 13.0 (L) o/w WNL (WBC 6.8, )   CMP: Potassium 3.3 (L), Ast 46 (H) o/w WNL (Creatinine 0.84)   Alcohol ethyl: <0.01  Acetaminophen level: <2  Salicylate level: <2    Drug abuse screen 77 urine: Ordered and Pending Sample.       Interventions:  0353 0.9% NaCl bolus 1000 mL IV     Emergency Department Course:  Past medical records, nursing notes, and vitals reviewed.  EKG obtained in the ED, see results above.    IV was inserted and blood was drawn for laboratory testing, results above.   The patient provided a urine sample here in the emergency department. This was sent for laboratory testing, findings above.     (0330)   I performed an exam of the patient as documented above. History obtained from patient and EMS personnel.     (0332)   Patient placed on an SYEDA hold.     (0501)   I rechecked the patient and discussed results and plan of care.     The patient was signed out to Dr. Dominique, [facility] ED physician, pending DEC evaluation.    I personally reviewed the laboratory results with the Patient and answered all related questions prior to sign out.     Impression & Plan     Medical Decision Making:  Juan C Allen is a 25 year old male who presents to the emergency room with a drug overdose, unclear if recreation or suicide. Mother has called the hospital and states in fact the patient is suicidal and has lost a loved one recently, and that he should not be discharged home. As such, I have placed the patient on a health officer hold, and he should be seen by a mental health team for a likely admission. Will sign out ultimate disposition to the care of Dr. Dominique, my colleague,  with plan for metabolization of his substances, as well as a formal mental health assessment.     Diagnosis:    ICD-10-CM    1. Drug overdose, undetermined intent, initial encounter  T50.904A    2. Suicidal behavior with attempted self-injury (H)  T14.91XA           Disposition:  Signed out to Dr. Dominique, pending DEC evaluation.     Scribe Disclosure:  I, John Villalta, am serving as a scribe at 3:17 AM on 6/26/2020 to document services personally performed by Pasha Gallego MD based on my observations and the provider's statements to me.  June 26, 2020   [facility] EMERGENCY DEPARTMENT       Pasha Gallego MD  06/26/20 0537

## 2020-06-26 NOTE — ED PROVIDER NOTES
Received signout from Dr. Gallego.  Briefly patient has a history of Suboxone use and presented after possible drug overdose.  He was reported to have taken a few bars of Xanax.  On arrival he shows evidence of intoxication.  Blood alcohol level is 0, Tylenol and salicylate levels are negative.  Dr. Gallego spoke with patient's mother who is quite concerned about patient having possible suicidal ideation.  Once clinically sober, plan for DEC assessment and likely psychiatric admission.    7:28 AM: I assessed the patient, patient is sleeping but arouses to voice.  He follows commands in all 4 extremities, pupils are equal and reactive.  Patient seems to agree with the story that he took Xanax but he is quite sleepy.  When asked about suicidal ideations his mother is concerned, patient states he does not want to kill himself.  Pending dec assessment at this time.    2:50 PM: DEC assessed the patient and spoke with patient's mother.  There is still ongoing concern for possible suicidal ideations/occult attempts with these drug overdoses.  Patient pending transfer to [facility] for psychiatric admission.  I was informed that admitting physician Dr Braxton requested 72 hour hold which was ordered prior to transfer.  Upon sign to my colleague, patient was awaiting transfer.     Magdaleno Dominique MD  06/26/20 9988
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
## 2020-06-26 NOTE — ED NOTES
Spoke with pts Mother by phone. She reports that his brother  today, and his twin  last year. Her concern is that he wants to die, and if he is discharged that will happen.  MD informed. Will have pt talk with DEC when sober.

## 2020-06-26 NOTE — ED NOTES
"When asked what happened tonight pt states \"I'm not telling you. I don't want you to tell my parents\". I reassured him that because he's an adult no information will be shared unless it's OK with him. He declined to answer any further questions.   "

## 2020-06-26 NOTE — ED NOTES
Restraints discontinued at this time. Pt. Able to verbalize understanding of need to stay calm. Continues resting quietly on cart.

## 2020-06-27 LAB
ALBUMIN SERPL-MCNC: 3.7 G/DL (ref 3.4–5)
ALP SERPL-CCNC: 93 U/L (ref 40–150)
ALT SERPL W P-5'-P-CCNC: 36 U/L (ref 0–70)
ANION GAP SERPL CALCULATED.3IONS-SCNC: 4 MMOL/L (ref 3–14)
AST SERPL W P-5'-P-CCNC: 21 U/L (ref 0–45)
BASOPHILS # BLD AUTO: 0 10E9/L (ref 0–0.2)
BASOPHILS NFR BLD AUTO: 0.1 %
BILIRUB SERPL-MCNC: 0.8 MG/DL (ref 0.2–1.3)
BUN SERPL-MCNC: 10 MG/DL (ref 7–30)
CALCIUM SERPL-MCNC: 8.7 MG/DL (ref 8.5–10.1)
CHLORIDE SERPL-SCNC: 107 MMOL/L (ref 94–109)
CHOLEST SERPL-MCNC: 131 MG/DL
CO2 SERPL-SCNC: 28 MMOL/L (ref 20–32)
CREAT SERPL-MCNC: 0.82 MG/DL (ref 0.66–1.25)
DIFFERENTIAL METHOD BLD: NORMAL
EOSINOPHIL # BLD AUTO: 0.1 10E9/L (ref 0–0.7)
EOSINOPHIL NFR BLD AUTO: 1.3 %
ERYTHROCYTE [DISTWIDTH] IN BLOOD BY AUTOMATED COUNT: 11.6 % (ref 10–15)
GFR SERPL CREATININE-BSD FRML MDRD: >90 ML/MIN/{1.73_M2}
GLUCOSE SERPL-MCNC: 91 MG/DL (ref 70–99)
HCT VFR BLD AUTO: 40 % (ref 40–53)
HDLC SERPL-MCNC: 57 MG/DL
HGB BLD-MCNC: 13.6 G/DL (ref 13.3–17.7)
IMM GRANULOCYTES # BLD: 0 10E9/L (ref 0–0.4)
IMM GRANULOCYTES NFR BLD: 0.3 %
INTERPRETATION ECG - MUSE: NORMAL
LDLC SERPL CALC-MCNC: 64 MG/DL
LYMPHOCYTES # BLD AUTO: 1.5 10E9/L (ref 0.8–5.3)
LYMPHOCYTES NFR BLD AUTO: 20.6 %
MCH RBC QN AUTO: 28.8 PG (ref 26.5–33)
MCHC RBC AUTO-ENTMCNC: 34 G/DL (ref 31.5–36.5)
MCV RBC AUTO: 85 FL (ref 78–100)
MONOCYTES # BLD AUTO: 0.5 10E9/L (ref 0–1.3)
MONOCYTES NFR BLD AUTO: 6.5 %
NEUTROPHILS # BLD AUTO: 5.1 10E9/L (ref 1.6–8.3)
NEUTROPHILS NFR BLD AUTO: 71.2 %
NONHDLC SERPL-MCNC: 74 MG/DL
NRBC # BLD AUTO: 0 10*3/UL
NRBC BLD AUTO-RTO: 0 /100
PLATELET # BLD AUTO: 155 10E9/L (ref 150–450)
POTASSIUM SERPL-SCNC: 3.9 MMOL/L (ref 3.4–5.3)
PROT SERPL-MCNC: 6.9 G/DL (ref 6.8–8.8)
RBC # BLD AUTO: 4.72 10E12/L (ref 4.4–5.9)
SODIUM SERPL-SCNC: 139 MMOL/L (ref 133–144)
T4 FREE SERPL-MCNC: 1.07 NG/DL (ref 0.76–1.46)
TRIGL SERPL-MCNC: 52 MG/DL
TSH SERPL DL<=0.005 MIU/L-ACNC: 0.07 MU/L (ref 0.4–4)
WBC # BLD AUTO: 7.2 10E9/L (ref 4–11)

## 2020-06-27 PROCEDURE — 80053 COMPREHEN METABOLIC PANEL: CPT | Performed by: CLINICAL NURSE SPECIALIST

## 2020-06-27 PROCEDURE — 36415 COLL VENOUS BLD VENIPUNCTURE: CPT | Performed by: CLINICAL NURSE SPECIALIST

## 2020-06-27 PROCEDURE — 25000132 ZZH RX MED GY IP 250 OP 250 PS 637: Performed by: CLINICAL NURSE SPECIALIST

## 2020-06-27 PROCEDURE — 99223 1ST HOSP IP/OBS HIGH 75: CPT | Mod: GT | Performed by: CLINICAL NURSE SPECIALIST

## 2020-06-27 PROCEDURE — 84439 ASSAY OF FREE THYROXINE: CPT | Performed by: CLINICAL NURSE SPECIALIST

## 2020-06-27 PROCEDURE — 84443 ASSAY THYROID STIM HORMONE: CPT | Performed by: CLINICAL NURSE SPECIALIST

## 2020-06-27 PROCEDURE — 12400001 ZZH R&B MH UMMC

## 2020-06-27 PROCEDURE — 80061 LIPID PANEL: CPT | Performed by: CLINICAL NURSE SPECIALIST

## 2020-06-27 PROCEDURE — 85025 COMPLETE CBC W/AUTO DIFF WBC: CPT | Performed by: CLINICAL NURSE SPECIALIST

## 2020-06-27 RX ORDER — BUPRENORPHINE 2 MG/1
4 TABLET SUBLINGUAL DAILY
Status: DISCONTINUED | OUTPATIENT
Start: 2020-06-27 | End: 2020-07-08 | Stop reason: HOSPADM

## 2020-06-27 RX ORDER — LOPERAMIDE HCL 2 MG
2 CAPSULE ORAL 4 TIMES DAILY PRN
Status: DISCONTINUED | OUTPATIENT
Start: 2020-06-27 | End: 2020-07-08 | Stop reason: HOSPADM

## 2020-06-27 RX ORDER — ONDANSETRON 4 MG/1
4 TABLET, ORALLY DISINTEGRATING ORAL EVERY 6 HOURS PRN
Status: DISCONTINUED | OUTPATIENT
Start: 2020-06-27 | End: 2020-07-08 | Stop reason: HOSPADM

## 2020-06-27 RX ORDER — CLONIDINE HYDROCHLORIDE 0.1 MG/1
0.1 TABLET ORAL 2 TIMES DAILY PRN
Status: DISCONTINUED | OUTPATIENT
Start: 2020-06-27 | End: 2020-07-08 | Stop reason: HOSPADM

## 2020-06-27 RX ADMIN — CLONIDINE HYDROCHLORIDE 0.1 MG: 0.1 TABLET ORAL at 20:42

## 2020-06-27 RX ADMIN — BUPRENORPHINE HCL 4 MG: 2 TABLET SUBLINGUAL at 12:14

## 2020-06-27 ASSESSMENT — ACTIVITIES OF DAILY LIVING (ADL)
ORAL_HYGIENE: INDEPENDENT
DRESS: INDEPENDENT
HYGIENE/GROOMING: INDEPENDENT
LAUNDRY: WITH SUPERVISION
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
## 2020-06-27 NOTE — PLAN OF CARE
48 hour nursing assessment:  Pt evaluation continues. Assessed mood, anxiety, thoughts, and behavior. Is progressing towards goals. He understands he will stay till his hold is up. He says he feels in WD. Will begin subutex 4 mg. mssa 6 & COW 8 @ 1200. He isolates in room a lot but is redirectable. Encourage participation in groups and developing healthy coping skills. Pt denies auditory or visual  hallucinations. Refer to daily team meeting notes for individualized plan of care. Will continue to assess.

## 2020-06-27 NOTE — PHARMACY-ADMISSION MEDICATION HISTORY
Admission medication history for the June 26, 2020 admission is complete.     Interview Sources:  Patient, [facility] [place] Everywhere    Reliability of Source: Moderate - no office note in [facility] [place] Everywhere from 6/19/2020 when Suboxone was ordered    Medication Adherence: Moderate - lost his Suboxone on 06/24/2020    Current Outpatient Pharmacy:    [facility] #[id number] - [place], [place] - [address] [id number] - [place], [place] - [address] AT [address]    Changes made to [facility] medication list (reason)  Added: Suboxone  Deleted: none  Changed: none    Additional medication history information:   The patient reported he was only taking Suboxone. He took one tablet on 06/24/2020 then lost the rest of the tablets.    Suboxone 8/2 mg tablets - unable to find fill history but from [facility] [place] Everywhere was ordered on 06/19/2020    Prior to Admission Medication List:  Prior to Admission medications    Medication Sig Last Dose Taking? Auth Provider   buprenorphine-naloxone (SUBOXONE) 8-2 MG SUBL sublingual tablet Place 2 tablets under the tongue daily 6/24/2020 Yes Unknown, Entered By History   naloxone (NARCAN) 4 MG/0.1ML nasal spray Spray 1 spray (4 mg) into one nostril alternating nostrils as needed for opioid reversal every 2-3 minutes until assistance arrives  Yes Ros Hampton MD       Time spent: 20 minutes    Medication history completed by:   Ramona Narayanan, PharmD, BCPP  Behavioral Health Pharmacist  [facility])  Emergency Room Ascom: *[id number]  Emergency Room Pharmacist phone: [phone]
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
## 2020-06-28 PROCEDURE — 12400001 ZZH R&B MH UMMC

## 2020-06-28 PROCEDURE — 25000132 ZZH RX MED GY IP 250 OP 250 PS 637: Performed by: CLINICAL NURSE SPECIALIST

## 2020-06-28 RX ADMIN — CLONIDINE HYDROCHLORIDE 0.1 MG: 0.1 TABLET ORAL at 21:14

## 2020-06-28 RX ADMIN — HYDROXYZINE HYDROCHLORIDE 25 MG: 25 TABLET, FILM COATED ORAL at 11:04

## 2020-06-28 RX ADMIN — BUPRENORPHINE HCL 4 MG: 2 TABLET SUBLINGUAL at 08:13

## 2020-06-28 ASSESSMENT — MIFFLIN-ST. JEOR: SCORE: 1548.74

## 2020-06-28 NOTE — PROGRESS NOTES
"Juan C was isolative to his room this evening. He reports that he is sleeping a lot because he would like to leave. He said, \"Nothing against this place, it's fine, but I just want to go be with my family. I don't know if you heard what happened, but I just want to be with my family right now.\" He was calm, cooperative, and pleasant upon approach. He endorsed some feelings of withdrawal around 8:30PM, saying he felt hot and could not get comfortable. He took medication and then went to bed. He denies SI/SIB at this time.    Appetite: Good  Pain: N/A  Sleep: Good  SEs: N/A                  06/27/20 2100   Behavioral Health   Hallucinations denies / not responding to hallucinations   Thinking intact   Orientation person: oriented;place: oriented;date: oriented   Memory baseline memory   Insight denial of illness   Judgement intact   Eye Contact at examiner   Affect blunted, flat   Mood mood is calm   Physical Appearance/Attire attire appropriate to age and situation   Hygiene well groomed   Suicidality other (see comments)  (denies)   1. Wish to be Dead (Recent) No   2. Non-Specific Active Suicidal Thoughts (Recent) No   Change in Protective Factors? No   Enviromental Risk Factors None   Self Injury other (see comment)  (denies)   Elopement   (no statements or behaviors)   Activity isolative;withdrawn   Speech clear;coherent   Medication Sensitivity no stated side effects;no observed side effects   Psychomotor / Gait balanced;steady   Safety   Suicidality Status 15   Activities of Daily Living   Hygiene/Grooming independent   Oral Hygiene independent   Dress independent   Laundry with supervision   Room Organization independent     "

## 2020-06-28 NOTE — PLAN OF CARE
Problem: Suicidal Behavior  Goal: Suicidal Behavior is Absent or Managed  Outcome: Improving  Flowsheets (Taken 6/28/2020 1108)  Mutually Determined Action Steps (Provide Immediate/Ongoing Protective Physical Environment): (Denies suicidal ideations) other (see comments)   Patient has been pretty much isolative to his room. He came out for a shower and meals. Patient received hydroxyzine 25 mg at 1100 for anxiety (7/10).   Depression: Denies   Anxiety: 7/10  AH/VH: Denies  SI/SIB: Denies  Self harm thoughts: Denies  Appetite: Good  Sleep: Good  Med side effects: Denies

## 2020-06-28 NOTE — PROGRESS NOTES
Pt's vitals are within normal limits.   COWS at 1700 was 4 and MSSA was 6.   No complaints of withdrawal sx this shift.  Ate 100% of his dinner.  Sleeping/napping for the majority of the shift.      Pt reports an increase in anxiety and restlessness which he rated at 5 out of 10.  COWS was 3 and MSSA was 7.  Prn Clonidine 0.1 mg given for anxiety and possible withdrawal symptoms

## 2020-06-28 NOTE — PROVIDER NOTIFICATION
06/28/20 0607   Vital Signs   Temp 97.9  F (36.6  C)   Temp src Oral   Resp 16   Pulse 82   Pulse/Heart Rate Source Monitor   /68        06/28/20 0607   COWS (Clinical Opiate Withdrawal Scale)   Resting Pulse Rate 1-->pulse rate    Sweating 0-->no report of chills or flushing   Restlessness 1-->reports difficulty sitting still, but is able to do so   Pupil Size 0-->pupils pinned or normal size for room light   Bone or Joint Aches 0-->not present   Runny Nose or Tearing 0-->none present   GI Upset 0-->no GI symptoms   Tremor 0-->no tremor   Yawning 0-->no yawning   Anxiety or Irritability 1-->patient reports increasing irritability or anxiousness   Gooseflesh Skin 0-->skin is smooth   Score 3      06/28/20 0607   Modified Selective Severity Assessment (MSSA)   Eating Disturbances 0   Tremor 0   Sleep Disturbance 0   Clouding of Sensorium 0   Hallucinations 0   Quality of Contact 0   Agitation 1   Paroxysmal Sweats 0   Temperature 1   Pulse 2   Total MSSA Score 4     Focus: MSSA, COW, Vitals    Data: Patient calm and cooperative at this time. Rates anxiety at 6/10 at this time but denies need for medications. Denies need for further interventions. Will continue to monitor and provide interventions as necessary.

## 2020-06-29 PROCEDURE — 99232 SBSQ HOSP IP/OBS MODERATE 35: CPT | Mod: GT | Performed by: CLINICAL NURSE SPECIALIST

## 2020-06-29 PROCEDURE — 12400001 ZZH R&B MH UMMC

## 2020-06-29 PROCEDURE — HZ2ZZZZ DETOXIFICATION SERVICES FOR SUBSTANCE ABUSE TREATMENT: ICD-10-PCS | Performed by: CLINICAL NURSE SPECIALIST

## 2020-06-29 PROCEDURE — 25000132 ZZH RX MED GY IP 250 OP 250 PS 637: Performed by: CLINICAL NURSE SPECIALIST

## 2020-06-29 RX ORDER — ESCITALOPRAM OXALATE 10 MG/1
10 TABLET ORAL DAILY
Status: DISCONTINUED | OUTPATIENT
Start: 2020-07-03 | End: 2020-07-08 | Stop reason: HOSPADM

## 2020-06-29 RX ORDER — ESCITALOPRAM OXALATE 5 MG/1
5 TABLET ORAL DAILY
Status: COMPLETED | OUTPATIENT
Start: 2020-06-29 | End: 2020-07-02

## 2020-06-29 RX ADMIN — BUPRENORPHINE HCL 4 MG: 2 TABLET SUBLINGUAL at 08:46

## 2020-06-29 RX ADMIN — TRAZODONE HYDROCHLORIDE 50 MG: 50 TABLET ORAL at 21:04

## 2020-06-29 RX ADMIN — NICOTINE POLACRILEX 2 MG: 2 GUM, CHEWING BUCCAL at 17:35

## 2020-06-29 RX ADMIN — NICOTINE POLACRILEX 4 MG: 2 GUM, CHEWING BUCCAL at 13:00

## 2020-06-29 RX ADMIN — ESCITALOPRAM 5 MG: 5 TABLET, FILM COATED ORAL at 15:37

## 2020-06-29 ASSESSMENT — ACTIVITIES OF DAILY LIVING (ADL)
HYGIENE/GROOMING: INDEPENDENT
LAUNDRY: WITH SUPERVISION
DRESS: SCRUBS (BEHAVIORAL HEALTH)
ORAL_HYGIENE: INDEPENDENT
ORAL_HYGIENE: INDEPENDENT
DRESS: INDEPENDENT
HYGIENE/GROOMING: SHOWER;INDEPENDENT

## 2020-06-29 NOTE — PLAN OF CARE
BEHAVIORAL TEAM DISCUSSION    Participants:  Debra Naegele, APRN, Nae KELLER RN, Km RN, Maggie N, ARLENE, ARLENE Moon  Progress:  Continuing to assess  Anticipated length of stay:  TBD  Continued Stay Criteria/Rationale:  Civil Commitment Process initiated  Medical/Physical:  Per Internal Medicine  Precautions:   Behavioral Orders   Procedures    Assault precautions    Code 1 - Restrict to Unit    Routine Programming     As clinically indicated    Status 15     Every 15 minutes.    Suicide precautions     Patients on Suicide Precautions should have a Combination Diet ordered that includes a Diet selection(s) AND a Behavioral Tray selection for Safe Tray - with utensils, or Safe Tray - NO utensils      Withdrawal precautions     Plan:  Patient completed a psychiatric assessment.  CTC met with patient to complete the Initial Psychosocial Assessment. Civil Commitment Process initiated this hospitalization. During hospitalization patient will attend therapy groups and participate in unit programming. CTC angela coordinate disposition and aftercare plan.   Rationale for change in precautions or plan: No change

## 2020-06-29 NOTE — PROGRESS NOTES
Work Completed:  CTC attended Team Meeting:  Civil Commitment initiated:  Faxed completed commitment paperwork to [facility] Pre-Petition. Completed Team and Goal Notes    Discharge plan or goal:   TBD pending civil commitment determination                Barriers to discharge:   Unknown at this time as civil commitment initiated today

## 2020-06-29 NOTE — PROGRESS NOTES
Personal Plan of Care    Reasons you are in the Hospital  1. Suicidal ideation  2. Drug overdose  3. Increase in depressive symptoms  4. Mood dysregulation    Goals for Discharge   1. Absence of suicidal ideation  2. Decrease in depressive symptoms  3. Mood stabilization

## 2020-06-29 NOTE — PROGRESS NOTES
Pt has not attended scheduled occupational therapy sessions. Encourage attendance and participation. Pt will be given self-assessment form, and OT staff will explain the purpose of including them in their treatment plan and offer options for meeting their needs. Pt was provided an activity and wellness packet containing various resources, coping skills, and activities to keep them occupied during their stay when not participating in group.

## 2020-06-29 NOTE — PROGRESS NOTES
Pt was in his room this evening reading books and listening to music. Pt came out for dinner and snacks and went back to his room. Pt says he is not depressed but feels a bit anxious because he wants to go home and be with his family. Pt reports that this is his first hospitalization and he is unsure how he got her. Pt is aware of his drug issues and the dangerous of his behavior and he hopes to change that once he gets out of here. Pt denies hallucination, SI and SIB.                    06/28/20 2154   Behavioral Health   Hallucinations denies / not responding to hallucinations   Thinking distractable   Orientation time: oriented;date: oriented;place: oriented   Memory baseline memory   Insight insight appropriate to situation   Judgement impaired   Eye Contact at examiner   Affect blunted, flat   Mood mood is calm   Physical Appearance/Attire attire appropriate to age and situation   Hygiene well groomed   Suicidality other (see comments)  (pt denies)   1. Wish to be Dead (Recent) No   Self Injury other (see comment)  (pt denies)   Elopement   (no concern)   Activity isolative;withdrawn   Speech clear;pressured   Medication Sensitivity no stated side effects   Psychomotor / Gait steady;balanced

## 2020-06-29 NOTE — PROGRESS NOTES
"[facility] [place]   Psychiatric Progress Note        Interim History:   The patient's care was discussed with the treatment team during the daily team meeting and/or staff's chart notes were reviewed.  Staff report patient is isolating to room.     Psychiatric symptoms and interventions:   Patient is minimizing symptoms saying he feels great. Discussed with patient concern over 2 serious suicide attempts within 10 days of each other. Patient sates, \"But before that I did not have any mental health issues.\" Discussed with patient that he has had multiples loses within a short period of time and needs help in managing his feelings more effectively. Patient is very high risk of another serious suicide attempt.     Patient admitted his mood has been \"low\" lately and was willing to to trial Lexapro. Starting at 5 mg and will titrate to 10 mg. Explained this medication will not change his personality and is not addictive.     Medical: No active issues reported.  Reviewed admission labs.  CMP within normal limits.  TSH 0.07, low,  T4 of 1.07, within normal limits.  CBC with diff within normal limits.  COVID screen is negative.    Behavioral/psychology/social:   Encouraged patient to attend therapeutic hospital programming.   Use coping skills       Medications:       buprenorphine  4 mg Sublingual Daily          Allergies:   No Known Allergies       Labs:   No results found for this or any previous visit (from the past 24 hour(s)).       Psychiatric Examination:     /68   Pulse 85   Temp 96.3  F (35.7  C) (Oral)   Resp 16   Ht 1.803 m (5' 11\")   Wt 53.7 kg (118 lb 4.8 oz)   SpO2 98%   BMI 16.50 kg/m    Weight is 118 lbs 4.8 oz  Body mass index is 16.5 kg/m .  Orthostatic Vitals       Most Recent      Sitting Orthostatic /79 06/29 0700    Sitting Orthostatic Pulse (bpm) 82 06/29 0700    Standing Orthostatic /85 06/29 0700    Standing Orthostatic Pulse (bpm) 86 06/29 " 0700            Appearance: awake, alert, adequately groomed and dressed in hospital scrubs  Attitude:  guarded  Eye Contact:  good  Mood:  anxious and depressed  Affect:  intensity is blunted  Speech:  normal prosody  Psychomotor Behavior:  no evidence of tardive dyskinesia, dystonia, or tics  Throught Process:  logical, linear and goal oriented  Associations:  no loose associations  Thought Content:  passive suicidal ideation present  Insight:  limited  Judgement:  limited  Oriented to:  time, person, and place  Attention Span and Concentration:  intact  Recent and Remote Memory:  intact    Clinical Global Impressions  First:  Considering your total clinical experience with this particular patient population, how severe are the patient's symptoms at this time?: 6 (06/27/20 0838)  Compared to the patient's condition at the START of treatment, this patient's condition is: 6 (06/27/20 0838)  Most recent:  Considering your total clinical experience with this particular patient population, how severe are the patient's symptoms at this time?: 6 (06/27/20 0838)  Compared to the patient's condition at the START of treatment, this patient's condition is: 6 (06/27/20 0838)         Precautions:     Behavioral Orders   Procedures     Assault precautions     Code 1 - Restrict to Unit     Routine Programming     As clinically indicated     Status 15     Every 15 minutes.     Suicide precautions     Patients on Suicide Precautions should have a Combination Diet ordered that includes a Diet selection(s) AND a Behavioral Tray selection for Safe Tray - with utensils, or Safe Tray - NO utensils       Withdrawal precautions          DIagnoses:   1.  Major depressive disorder, first episode, severe without psychosis.   2.  Intentional overdose with Xanax and opiates.   3.  Opiate use disorder, severe.   4.  Rule out benzodiazepine use disorder.   5.  Cannabis use disoder         Plan:   Legal status: 72 hour hold initiated on 6/26.  Provider petitioned for MICD commitment due to patient high risk of suicide attempt and/or relapse.    Medication management:   Started Lexapro 5 mg and will titrate to 10 mg to address depressive and anxiety symptoms.   Buprenorphine 4 mg for opiate maintenance.     Disposition plan:   Reason for continued hospitalization: Patient at imminent risk for self harm.   Stabilize with medications.   MICD treatment.     CONSENT FOR TELEMEDICINE VISIT:  The patient's condition can be safely assessed and treated via synchronous audio and telemedicine encounter.      REASON FOR TELEMEDICINE VISIT:  COVID-19.        ORIGINATING SITE (PATIENT LOCATION):  [facility], Inpatient Psychiatry, Unit 4A.      DISTANT SITE (PROVIDER LOCATION):  Provider remote setting.      CONSENT:  The patient/guardian has verbally consented to potential risks and benefits of telemedicine (video visit) versus in-person care, bill my insurance or make self-payment for services provided, responsibility for payment of noncovered services.      MODE OF COMMUNICATION:  Video conference via [employer].        START TIME:  10:39 am        STOP TIME:  10:50 am      As the provider, I attest to compliance with applicable laws and regulations related to telemedicine.

## 2020-06-29 NOTE — PROGRESS NOTES
This writer called in petition to [facility] Pre-Petition and faxed completed commitment paperwork.

## 2020-06-29 NOTE — PROGRESS NOTES
"   06/29/20 1254   Behavioral Health   Hallucinations denies / not responding to hallucinations   Orientation person: oriented;place: oriented;date: oriented   Memory baseline memory   Insight poor   Judgement impaired   Eye Contact at examiner   Affect blunted, flat   Mood anxious   Physical Appearance/Attire attire appropriate to age and situation   Hygiene well groomed   1. Wish to be Dead (Recent) No   2. Non-Specific Active Suicidal Thoughts (Recent) No   Coping/Psychosocial   Verbalized Emotional State anxiety;depression   Trust Relationship/Rapport thoughts/feelings acknowledged;reassurance provided;empathic listening provided   Activities of Daily Living   Hygiene/Grooming shower;independent   Oral Hygiene independent   Dress scrubs (behavioral health)   Laundry with supervision   Room Organization independent   Activity   Activity Assistance Provided independent     After hearing from provider today about MICD commitment, patient is a anxious and depressed that \"I might be here longer\".  Continues to isolate in room.  Was given activities to do in room.  Ate all is meals in room. Showered, denied SI.  "

## 2020-06-30 PROCEDURE — 12400001 ZZH R&B MH UMMC

## 2020-06-30 PROCEDURE — 99232 SBSQ HOSP IP/OBS MODERATE 35: CPT | Mod: GT | Performed by: CLINICAL NURSE SPECIALIST

## 2020-06-30 PROCEDURE — 25000132 ZZH RX MED GY IP 250 OP 250 PS 637: Performed by: CLINICAL NURSE SPECIALIST

## 2020-06-30 PROCEDURE — H2032 ACTIVITY THERAPY, PER 15 MIN: HCPCS

## 2020-06-30 RX ADMIN — NICOTINE POLACRILEX 2 MG: 2 GUM, CHEWING BUCCAL at 12:49

## 2020-06-30 RX ADMIN — NICOTINE POLACRILEX 2 MG: 2 GUM, CHEWING BUCCAL at 21:50

## 2020-06-30 RX ADMIN — NICOTINE POLACRILEX 2 MG: 2 GUM, CHEWING BUCCAL at 17:44

## 2020-06-30 RX ADMIN — ESCITALOPRAM 5 MG: 5 TABLET, FILM COATED ORAL at 08:51

## 2020-06-30 RX ADMIN — HYDROXYZINE HYDROCHLORIDE 50 MG: 25 TABLET, FILM COATED ORAL at 21:48

## 2020-06-30 RX ADMIN — BUPRENORPHINE HCL 4 MG: 2 TABLET SUBLINGUAL at 08:51

## 2020-06-30 RX ADMIN — HYDROXYZINE HYDROCHLORIDE 50 MG: 25 TABLET, FILM COATED ORAL at 15:21

## 2020-06-30 RX ADMIN — NICOTINE POLACRILEX 2 MG: 2 GUM, CHEWING BUCCAL at 11:03

## 2020-06-30 ASSESSMENT — ACTIVITIES OF DAILY LIVING (ADL)
ORAL_HYGIENE: INDEPENDENT
DRESS: INDEPENDENT
HYGIENE/GROOMING: INDEPENDENT

## 2020-06-30 ASSESSMENT — MIFFLIN-ST. JEOR: SCORE: 1558.71

## 2020-06-30 NOTE — PLAN OF CARE
48 hour nursing assessment:  Pt evaluation continues. Assessed mood, anxiety, thoughts, and behavior. Is progressing towards goals. He is pleasant and cooperative with the unit rules and programming but declines to discuss the issues that brought him in here. Encourage participation in groups and developing healthy coping skills. Pt denies auditory or visual  hallucinations. Refer to daily team meeting notes for individualized plan of care. Will continue to assess.

## 2020-06-30 NOTE — PROGRESS NOTES
Work Completed:Team Meeting, chart review  Civil Commitment initiated, waiting to here back from [facility] to find out if petition was supported.      Discharge plan or goal:   TBD pending civil commitment determination                 Barriers to discharge:   Unknown at this time as civil commitment initiated today
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
## 2020-07-01 PROCEDURE — H2032 ACTIVITY THERAPY, PER 15 MIN: HCPCS

## 2020-07-01 PROCEDURE — G0177 OPPS/PHP; TRAIN & EDUC SERV: HCPCS

## 2020-07-01 PROCEDURE — 25000132 ZZH RX MED GY IP 250 OP 250 PS 637: Performed by: CLINICAL NURSE SPECIALIST

## 2020-07-01 PROCEDURE — 12400001 ZZH R&B MH UMMC

## 2020-07-01 PROCEDURE — 99232 SBSQ HOSP IP/OBS MODERATE 35: CPT | Mod: GT | Performed by: CLINICAL NURSE SPECIALIST

## 2020-07-01 RX ADMIN — NICOTINE POLACRILEX 4 MG: 2 GUM, CHEWING BUCCAL at 19:56

## 2020-07-01 RX ADMIN — ESCITALOPRAM 5 MG: 5 TABLET, FILM COATED ORAL at 08:36

## 2020-07-01 RX ADMIN — HYDROXYZINE HYDROCHLORIDE 50 MG: 25 TABLET, FILM COATED ORAL at 21:52

## 2020-07-01 RX ADMIN — NICOTINE POLACRILEX 2 MG: 2 GUM, CHEWING BUCCAL at 10:12

## 2020-07-01 RX ADMIN — NICOTINE POLACRILEX 4 MG: 2 GUM, CHEWING BUCCAL at 13:06

## 2020-07-01 RX ADMIN — NICOTINE POLACRILEX 4 MG: 2 GUM, CHEWING BUCCAL at 17:34

## 2020-07-01 RX ADMIN — BUPRENORPHINE HCL 4 MG: 2 TABLET SUBLINGUAL at 08:37

## 2020-07-01 ASSESSMENT — ACTIVITIES OF DAILY LIVING (ADL)
ORAL_HYGIENE: INDEPENDENT
DRESS: INDEPENDENT
HYGIENE/GROOMING: INDEPENDENT

## 2020-07-01 NOTE — PROGRESS NOTES
06/30/20 [facility]   Therapeutic Recreation   Type of Intervention structured groups   Activity game   Response Participates, initiates socially appropriate   Hours 1     Pt actively participated in a structured Therapeutic Recreation group with a focus on leisure participation, communication skills, and social engagement via a group game. Pt remained focused and engaged throughout full duration of group.  Pt mood was calm and was appropriate with interactions.  Appropriately shared sense of humor with peers during the group and appeared to brighten with social interaction.

## 2020-07-01 NOTE — PLAN OF CARE
"INITIAL OT NOTE  Problem: OT General Care Plan  Goal: OT Goal 1    Pt attended about 15 minutes of OT clinic group. Pt was social and interactive with peers but mostly observed others projects and declined initiation of task. Pt did explore sensory bin options. Pt participated in a structured occupational therapy discussion group with a focus on health and wholeness designed to promote insight and to explore positive coping skills. Pt was instructed to identify symptoms, causes, and strategies to address symptoms in each dimension of health (physical, emotional, spiritual, intellectual, and spiritual). Pt shared \"going to the doctor, music, and reading\" as strategies to enhance his health and wholeness. Pt demonstrated active listening and shared thoughtful contributions with other group members. Pt insightful, cooperative, and engaged. Bright affect.        "
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
## 2020-07-01 NOTE — CONSULTS
7/1/2020    CD consult acknowledged. Please provide pt with CD paperwork and email when completed.  [email or web address]    PEE Brownlee

## 2020-07-01 NOTE — PROGRESS NOTES
20 1700   Behavioral Health   Hallucinations denies / not responding to hallucinations   Thinking intact   Orientation person: oriented;place: oriented;date: oriented;time: oriented   Memory baseline memory   Insight insight appropriate to situation;insight appropriate to events   Judgement intact   Eye Contact at examiner   Affect blunted, flat   Mood mood is calm   Physical Appearance/Attire attire appropriate to age and situation   Hygiene well groomed   Suicidality other (see comments)  (pt denied)   1. Wish to be Dead (Recent) No   2. Non-Specific Active Suicidal Thoughts (Recent) No   Self Injury other (see comment)  (denied SIB)   Elopement   (no concerns)   Activity isolative   Speech clear;coherent   Medication Sensitivity no observed side effects;no stated side effects   Psychomotor / Gait balanced;steady   Activities of Daily Living   Hygiene/Grooming independent   Oral Hygiene independent   Dress independent   Room Organization independent     Pt appears to have good insight. Pt stated that he made a mistake when he took too many pills the evening of his brother's . Pt stated that the loss was hard but he talked with his mother today about his brother's passing. Pt reported a desire to attend outpatient CD treatment as a way to work on himself. Pt stated he is open to a chemical health assessment if needed. Pt showed full range of affect and was cooperative with staff. Pt did appear tearful discussing his loss and pt feelings were validated. Pt was social and visible in the milieu.

## 2020-07-01 NOTE — PROGRESS NOTES
Work Completed: Team meeting, Chart review. MI/CD petition was supported and pt is on a court hold as of 12:57pm. Pt's mother called asking questions regarding pt. Writer does not see a ROB on file for her and informed her that I was unable to provide any information.     Discharge plan or goal: CD Treatment                 Barriers to discharge: Commitment process

## 2020-07-01 NOTE — PLAN OF CARE
48 hour nursing assessment:  Pt evaluation continues. Assessed mood, anxiety, thoughts, and behavior. Is progressing towards goals. Encourage participation in groups and developing healthy coping skills. Pt denies auditory or visual  hallucinations. Denies SI. Pt pleasant and cooperative. Refer to daily team meeting notes for individualized plan of care. Will continue to assess.

## 2020-07-02 ENCOUNTER — APPOINTMENT (OUTPATIENT)
Dept: BEHAVIORAL HEALTH | Facility: CLINIC | Age: 25
End: 2020-07-02
Attending: FAMILY MEDICINE
Payer: COMMERCIAL

## 2020-07-02 PROCEDURE — 99207 ZZC CDG-MDM COMPONENT: MEETS MODERATE - DOWN CODED: CPT | Performed by: PSYCHIATRY & NEUROLOGY

## 2020-07-02 PROCEDURE — 12400001 ZZH R&B MH UMMC

## 2020-07-02 PROCEDURE — H2032 ACTIVITY THERAPY, PER 15 MIN: HCPCS

## 2020-07-02 PROCEDURE — 25000132 ZZH RX MED GY IP 250 OP 250 PS 637: Performed by: PSYCHIATRY & NEUROLOGY

## 2020-07-02 PROCEDURE — 99232 SBSQ HOSP IP/OBS MODERATE 35: CPT | Mod: GT | Performed by: PSYCHIATRY & NEUROLOGY

## 2020-07-02 PROCEDURE — H0001 ALCOHOL AND/OR DRUG ASSESS: HCPCS | Mod: HF

## 2020-07-02 PROCEDURE — 25000132 ZZH RX MED GY IP 250 OP 250 PS 637: Performed by: CLINICAL NURSE SPECIALIST

## 2020-07-02 RX ORDER — LANOLIN ALCOHOL/MO/W.PET/CERES
3 CREAM (GRAM) TOPICAL AT BEDTIME
Status: DISCONTINUED | OUTPATIENT
Start: 2020-07-02 | End: 2020-07-08 | Stop reason: HOSPADM

## 2020-07-02 RX ADMIN — BUPRENORPHINE HCL 4 MG: 2 TABLET SUBLINGUAL at 08:59

## 2020-07-02 RX ADMIN — NICOTINE POLACRILEX 4 MG: 2 GUM, CHEWING BUCCAL at 10:02

## 2020-07-02 RX ADMIN — NICOTINE POLACRILEX 4 MG: 2 GUM, CHEWING BUCCAL at 11:34

## 2020-07-02 RX ADMIN — ESCITALOPRAM 5 MG: 5 TABLET, FILM COATED ORAL at 08:59

## 2020-07-02 RX ADMIN — NICOTINE POLACRILEX 4 MG: 2 GUM, CHEWING BUCCAL at 20:01

## 2020-07-02 RX ADMIN — NICOTINE POLACRILEX 4 MG: 2 GUM, CHEWING BUCCAL at 13:49

## 2020-07-02 RX ADMIN — NICOTINE POLACRILEX 4 MG: 2 GUM, CHEWING BUCCAL at 18:17

## 2020-07-02 RX ADMIN — MELATONIN TAB 3 MG 3 MG: 3 TAB at 22:11

## 2020-07-02 ASSESSMENT — ACTIVITIES OF DAILY LIVING (ADL)
ORAL_HYGIENE: INDEPENDENT
DRESS: SCRUBS (BEHAVIORAL HEALTH);INDEPENDENT
HYGIENE/GROOMING: INDEPENDENT

## 2020-07-02 NOTE — PROGRESS NOTES
07/01/20 2200   Art Therapy   Type of Intervention structured groups   Response participates with  encouragement    Hours 1   Treatment Detail    (Art Therapy) strengths trees    Art Therapy Goal-to cope, express, contribute, regulate and sublimate emotions through the creative arts process and Art Therapy directives within a group setting.     Outcome- pt was pleasant,cooperative, polite and engaged. He said he felt good but tired. He did a thoughtful project. He was apologetic about the quality of his art. Writer reassured him that he did a very nice and thoughtful job . He enjoyed the social aspect of group and also selecting music for the group. He was very conscientious about making sure music was appropriate for group. He would like to address substance abuse issues in Art Therapy tomorrow.

## 2020-07-02 NOTE — PROGRESS NOTES
Pt had a good shift. Active on milieu and calm/cooperative with staff. Pt has good insight into mental health and is motivated to go to treatment. Pt is hoping to get a stay of commitment. Pt reports some anxiety but says it is manageable. Pt denies SI/SIB. Ate dinner and attended group.          07/02/20 1800   Behavioral Health   Hallucinations denies / not responding to hallucinations   Thinking intact   Orientation person: oriented;place: oriented;date: oriented;time: oriented   Memory baseline memory   Insight admits / accepts   Judgement intact   Eye Contact at examiner   Affect full range affect   Mood mood is calm   Physical Appearance/Attire attire appropriate to age and situation   Hygiene well groomed   Suicidality other (see comments)  (denies)   1. Wish to be Dead (Recent) No   2. Non-Specific Active Suicidal Thoughts (Recent) No   Self Injury other (see comment)  (denies)   Elopement   (none stated or observed)   Activity other (see comment)  (active on milieu)   Speech clear;coherent   Medication Sensitivity no stated side effects;no observed side effects   Psychomotor / Gait balanced;steady   Activities of Daily Living   Hygiene/Grooming independent   Oral Hygiene independent   Dress scrubs (behavioral health);independent   Room Organization independent

## 2020-07-02 NOTE — PROGRESS NOTES
07/02/20 2200   Art Therapy   Type of Intervention structured groups   Response participates with encrouagement   Hours 1.5   Treatment Detail    Art therapy/movement   Art Therapy Goal-to cope, express, contribute, regulate and sublimate emotions through the creative arts process and Art Therapy directives within a group setting.     Outcome- pt participated in late morning group today.  He was dissapointed he missed the first group. He did a very thoughtful painting about the twelve steps and feelings using color to depict each step and feeling. It was shaped in a curved form like a rainbow. He seemed proud of it.He is influenced by a disruptive male peer but he tried to advocate for following unit guidelines and respect. He will ask his peer to be respectful when the peer is disruptive. Pt is pleasant, cooperative and engaged and seems to have insight about his substance abuse.

## 2020-07-02 NOTE — CONSULTS
"7/2/2020    The patient has been notified of the following:     \"We have found that certain health care needs can be provided without the need for a face to face visit.  This service lets us provide the care you need with a phone conversation.  I will have full access to your [facility] medical record during this entire phone call.   I will be taking notes for your medical record. Since this is like an office visit, we will bill your insurance company for this service.  If your insurance denies the charge we will appeal and/or write off the cost of the service.  The Governor's executive order may result in expanded health insurance coverage for this service, which would be paid by your insurance.    There are potential benefits and risks of telephone visits (e.g. limits to patient confidentiality) that differ from in-person visits.?  Confidentiality still applies for telephone services, and nobody will record the visit.  It is important to be in a quiet, private space that is free of distractions (including cell phone or other devices) during the visit.??   If during the course of the call I believe a telephone visit is not appropriate, you will not be charged for this service\"    Consent has been obtained for this service by care team member: Yes    Phone visit start time:  5:00pm  Phone visit end time:  5:12pm    CD assessment completed. Pt is in agreement with IOP at [facility] in [place]. Pt understood if he struggles with sobriety, they will recommend a higher level of care. Pt also understood that his groups/therapy would be completed by Telehealth due to COVID. [facility] offers individual counseling (so pt can work on grief), along with MH and CD groups and medication management.     [facility]  [address]  [address]  [place] [address]  New & Current Clients  [phone]  Fax  [phone]    Desire Alvarado, " PEE  [email or web address]  [phone]
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
## 2020-07-02 NOTE — PROGRESS NOTES
Juan C was visible in the milieu this evening. He was social and appropriate with peers. Full-range affect and calm mood. He is accepting of whatever the next treatment plan will be. He denies SI/SIB.     Appetite: Good  Pain: N/A  SEs: N/A  Sleep: Good          07/01/20 1900   Behavioral Health   Hallucinations denies / not responding to hallucinations   Thinking intact   Orientation person: oriented;place: oriented;date: oriented   Memory baseline memory   Insight admits / accepts   Judgement intact   Eye Contact at examiner   Affect full range affect   Mood mood is calm   Physical Appearance/Attire attire appropriate to age and situation   Hygiene well groomed   Suicidality other (see comments)  (denies)   1. Wish to be Dead (Recent) No   2. Non-Specific Active Suicidal Thoughts (Recent) No   Change in Protective Factors? No   Enviromental Risk Factors None   Self Injury other (see comment)  (denies)   Elopement   (no statements or actions)   Activity other (see comment)  (visible)   Speech clear;coherent   Medication Sensitivity no stated side effects;no observed side effects   Psychomotor / Gait balanced;steady   Activities of Daily Living   Hygiene/Grooming independent   Oral Hygiene independent   Dress independent   Room Organization independent
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
## 2020-07-03 PROCEDURE — H2032 ACTIVITY THERAPY, PER 15 MIN: HCPCS

## 2020-07-03 PROCEDURE — 25000132 ZZH RX MED GY IP 250 OP 250 PS 637: Performed by: CLINICAL NURSE SPECIALIST

## 2020-07-03 PROCEDURE — 99232 SBSQ HOSP IP/OBS MODERATE 35: CPT | Mod: GT | Performed by: PSYCHIATRY & NEUROLOGY

## 2020-07-03 PROCEDURE — 99207 ZZC CDG-MDM COMPONENT: MEETS MODERATE - DOWN CODED: CPT | Performed by: PSYCHIATRY & NEUROLOGY

## 2020-07-03 PROCEDURE — 12400001 ZZH R&B MH UMMC

## 2020-07-03 RX ADMIN — NICOTINE POLACRILEX 4 MG: 2 GUM, CHEWING BUCCAL at 17:42

## 2020-07-03 RX ADMIN — NICOTINE POLACRILEX 4 MG: 2 GUM, CHEWING BUCCAL at 13:05

## 2020-07-03 RX ADMIN — ESCITALOPRAM OXALATE 10 MG: 10 TABLET ORAL at 09:08

## 2020-07-03 RX ADMIN — NICOTINE POLACRILEX 4 MG: 2 GUM, CHEWING BUCCAL at 10:47

## 2020-07-03 RX ADMIN — NICOTINE POLACRILEX 4 MG: 2 GUM, CHEWING BUCCAL at 20:32

## 2020-07-03 RX ADMIN — TRAZODONE HYDROCHLORIDE 50 MG: 50 TABLET ORAL at 21:29

## 2020-07-03 RX ADMIN — BUPRENORPHINE HCL 4 MG: 2 TABLET SUBLINGUAL at 09:08

## 2020-07-03 ASSESSMENT — ACTIVITIES OF DAILY LIVING (ADL)
HYGIENE/GROOMING: INDEPENDENT
LAUNDRY: UNABLE TO COMPLETE
ORAL_HYGIENE: INDEPENDENT
DRESS: SCRUBS (BEHAVIORAL HEALTH);INDEPENDENT
HYGIENE/GROOMING: INDEPENDENT
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
## 2020-07-03 NOTE — PROGRESS NOTES
"   07/02/20 2000   Music Therapy   Type of Participation Music therapy group   Response Participates with encouragement   Treatment Detail .75   Juan C participated in Music Therapy group addressing \"ANTs\" or Automatic Negative Thoughts through the intervention of learning new instruments.  He tried the ukulele, guitar and drums.  He did express having \"ANTs\" of \"This is hard\" and \"I\"m no good at this\" while working to learn guitar and ukulele, but was able to practice reframing these thoughts with persistence and positive thinking. His affect was smiley and social.  Positive participation.   "

## 2020-07-03 NOTE — PROGRESS NOTES
07/03/20 2200   Art Therapy   Type of Intervention structured groups   Response participates with encrouagement   Hours 2.5   Treatment Detail    Art therapy/movement   Art Therapy Goal-to cope, express, contribute, regulate and sublimate emotions through the creative arts process and Art Therapy directives within a group setting.     Outcome- pt participated in the majority of group time. He painted with acrylic paints, he likes making stripes, color studies. He is good about asserting himself and holding good boundaries in groups when a particular male peer is disruptive, he corrects him. Although he is not the leader, watch conversational boundaries with a male peer, who will try to influence Juvencio to be disruptive in group. He is pleasant, cooperative, engaged and polite.

## 2020-07-04 PROCEDURE — 25000132 ZZH RX MED GY IP 250 OP 250 PS 637: Performed by: CLINICAL NURSE SPECIALIST

## 2020-07-04 PROCEDURE — 12400001 ZZH R&B MH UMMC

## 2020-07-04 PROCEDURE — H2032 ACTIVITY THERAPY, PER 15 MIN: HCPCS

## 2020-07-04 RX ADMIN — NICOTINE POLACRILEX 4 MG: 2 GUM, CHEWING BUCCAL at 21:47

## 2020-07-04 RX ADMIN — NICOTINE POLACRILEX 4 MG: 2 GUM, CHEWING BUCCAL at 13:00

## 2020-07-04 RX ADMIN — BUPRENORPHINE HCL 4 MG: 2 TABLET SUBLINGUAL at 09:43

## 2020-07-04 RX ADMIN — NICOTINE POLACRILEX 4 MG: 2 GUM, CHEWING BUCCAL at 10:52

## 2020-07-04 RX ADMIN — NICOTINE POLACRILEX 4 MG: 2 GUM, CHEWING BUCCAL at 18:57

## 2020-07-04 RX ADMIN — ESCITALOPRAM OXALATE 10 MG: 10 TABLET ORAL at 09:42

## 2020-07-04 RX ADMIN — HYDROXYZINE HYDROCHLORIDE 50 MG: 25 TABLET, FILM COATED ORAL at 21:37

## 2020-07-04 ASSESSMENT — ACTIVITIES OF DAILY LIVING (ADL)
DRESS: INDEPENDENT
HYGIENE/GROOMING: INDEPENDENT
HYGIENE/GROOMING: INDEPENDENT
LAUNDRY: UNABLE TO COMPLETE
ORAL_HYGIENE: INDEPENDENT

## 2020-07-04 NOTE — PLAN OF CARE
"Patient evaluation continues. Assessed mood, anxiety, thoughts and behavior     Patient up for morning routine.  Full range.  Participated in yoga group.  Pleasant and social.  Denies SI/SIB or thoughts to hurt others.  Denies hallucinations.  Thinking is linear and organized.  Rates depression and anxiety both as 3-4, feels they are manageable.  Reports he slept well last night and did not have nightmares.  Denies concerns regarding appetite, medication side effects.  Feels concentration is \"pretty good\".  Offers no other concerns, questions at this time.  "

## 2020-07-04 NOTE — PLAN OF CARE
Pt out in the milieu most of the shift. He attended and participated in groups. Sat with peers and watched a movie. Ate well at supper stating his appetite is coming back now and has eaten 3 good meals today. Did say he was having a little trouble sleeping at night and wanted to take the trazadone tonight as he did not feel the melatonin was effective. Pt states having some nightmares which he would like the Dr to know about. Pt endorses depression at a 5 out of 10 and anxiety at a 6-7 out of 10. Pt informed he has antianxiety medication available if he needs it but states he likes to try and distract himself in other ways so he doesn't have to use medication. Pt denies SI, SIB, hallucinations, or thoughts to harm others. Denies any side effect problems from his medications. Pt has a bright affect when approached and is calm appearing. He states he thinks he needs to see a therapist once he is out of the hospital and family is in agreement. Pt is going to school at [employer] and says he has plans for the future. He would like to be in a helping profession.

## 2020-07-05 PROCEDURE — 25000132 ZZH RX MED GY IP 250 OP 250 PS 637: Performed by: CLINICAL NURSE SPECIALIST

## 2020-07-05 PROCEDURE — 12400001 ZZH R&B MH UMMC

## 2020-07-05 RX ADMIN — BUPRENORPHINE HCL 4 MG: 2 TABLET SUBLINGUAL at 08:12

## 2020-07-05 RX ADMIN — NICOTINE POLACRILEX 4 MG: 2 GUM, CHEWING BUCCAL at 17:56

## 2020-07-05 RX ADMIN — NICOTINE POLACRILEX 4 MG: 2 GUM, CHEWING BUCCAL at 09:36

## 2020-07-05 RX ADMIN — ESCITALOPRAM OXALATE 10 MG: 10 TABLET ORAL at 08:12

## 2020-07-05 RX ADMIN — HYDROXYZINE HYDROCHLORIDE 50 MG: 25 TABLET, FILM COATED ORAL at 21:28

## 2020-07-05 RX ADMIN — NICOTINE POLACRILEX 4 MG: 2 GUM, CHEWING BUCCAL at 12:47

## 2020-07-05 RX ADMIN — NICOTINE POLACRILEX 4 MG: 2 GUM, CHEWING BUCCAL at 19:50

## 2020-07-05 ASSESSMENT — MIFFLIN-ST. JEOR: SCORE: 1575.13

## 2020-07-05 ASSESSMENT — ACTIVITIES OF DAILY LIVING (ADL)
HYGIENE/GROOMING: INDEPENDENT
LAUNDRY: WITH SUPERVISION
DRESS: SCRUBS (BEHAVIORAL HEALTH);INDEPENDENT
ORAL_HYGIENE: INDEPENDENT

## 2020-07-05 NOTE — PLAN OF CARE
Pt slept in until after supper when he came out to eat. Pt has a bright affect when approached. States he slept okay last night but just wanted to nap as well. Demeanor is calm, pleasant. Watched a movie for a while then made a phone call. Pt attended and participated in evening group. Pt watched a movie again after group. He is social with peers. Denies SI,SIB, HI, hallucinations, depression. States anxiety is very minimal today. States he's had a good day today. States he did not have any nightmares last night. Pt requested prn hydroxyzine tonight and did not want the melatonin.

## 2020-07-05 NOTE — PROGRESS NOTES
" 07/04/20 2200   Art Therapy   Type of Intervention structured groups   Response participates with  encouragement    Hours 1   Treatment Detail    (Art Therapy) freedom   Art Therapy Goal-to cope, express, contribute, regulate and sublimate emotions through the creative arts process and Art Therapy directives within a group setting.     Outcome- pt was pleasant, cooperative and quietly engaged and focused. He said he was working with the concept of freedom from : the hospital, the past, depression and substance abuse. He quietly worked and declined to share. He said \" I was thinking about a lot, about my family.\" \"I will share with you Monday but I don't want to share those thoughts with the group.\" Writer and he agreed to meet Monday to share the art meaning.       "

## 2020-07-05 NOTE — PLAN OF CARE
Patient had an uneventful and pleasant shift. Patient was still in bed sleeping at the beginning of the shift. Patient is compliant with scheduled medications. Patient ate breakfast, showered and spent most part of the morning shift in the lounge with peers watching TV. PRN nicotine gum requested and given to patient. Patient denies all mental health assessment questions. Patient presents with bright full range affect and in a happy mode. Looking forward to discharge. No concerns at this time.

## 2020-07-06 PROCEDURE — 12400001 ZZH R&B MH UMMC

## 2020-07-06 PROCEDURE — 25000132 ZZH RX MED GY IP 250 OP 250 PS 637: Performed by: CLINICAL NURSE SPECIALIST

## 2020-07-06 PROCEDURE — G0177 OPPS/PHP; TRAIN & EDUC SERV: HCPCS

## 2020-07-06 RX ADMIN — NICOTINE POLACRILEX 4 MG: 2 GUM, CHEWING BUCCAL at 13:11

## 2020-07-06 RX ADMIN — HYDROXYZINE HYDROCHLORIDE 50 MG: 25 TABLET, FILM COATED ORAL at 20:47

## 2020-07-06 RX ADMIN — ESCITALOPRAM OXALATE 10 MG: 10 TABLET ORAL at 09:48

## 2020-07-06 RX ADMIN — BUPRENORPHINE HCL 4 MG: 2 TABLET SUBLINGUAL at 09:47

## 2020-07-06 RX ADMIN — NICOTINE POLACRILEX 4 MG: 2 GUM, CHEWING BUCCAL at 17:43

## 2020-07-06 RX ADMIN — NICOTINE POLACRILEX 4 MG: 2 GUM, CHEWING BUCCAL at 10:07

## 2020-07-06 ASSESSMENT — ACTIVITIES OF DAILY LIVING (ADL)
ORAL_HYGIENE: INDEPENDENT
DRESS: INDEPENDENT
HYGIENE/GROOMING: INDEPENDENT
HYGIENE/GROOMING: INDEPENDENT

## 2020-07-06 NOTE — PLAN OF CARE
"Problem: OT General Care Plan  Goal: OT Goal 1  Description: Pt will practice using >2 coping strategies to manage stress and reduce symptoms to demonstrate increased readiness for discharge.     Pt actively participated in occupational therapy clinic. Pt was able to ask for assistance as needed, and independently initiate a creative expression task. Pt demonstrated good focus and planning. Pt accepts redirections but at times appears pressured to conform to oppositional behaviors of other peers. Pt appeared comfortable interacting with peers. Pt actively participated in a structured occupational therapy group with a focus on identifying and prioritizing personal values. Pt contributed to a group discussion that facilitated self-reflection and application of personal values. Using a list of values, pt identified \"growth, accomplishment, and trust\" as their most important values. When prompted to share one takeaway from the group/one value they hope to focus on in the future, pt states \"I know that there is a gap in my growth right now but I want to change that about myself and start setting more goals for myself\". Pt very engaged and insightful with his responses. OT will check in tomorrow with pt offering goal-setting worksheets and activities for him to do while he's here.     "
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
## 2020-07-06 NOTE — PROGRESS NOTES
Work Completed: Chart review, Completed DA and sent to [facility].     Discharge plan or goal: Outpatient CD treatment                 Barriers to discharge: Commitment court

## 2020-07-06 NOTE — PROGRESS NOTES
07/05/20 2200   General Information   Art Directive other (see comments)   AT directive was to create an image of self as landscape using art media of pts choice. Goals of directive: creating a personal self narrative, identifying personal strengths and goals, emotional expression.   Pt was a positive participant, focused on task for the full duration of group. Pt cristóbal an image of himself climbing up a mountain with a smile growing bigger the farther he got to the top of the mountain.  Pts mood was calm, in good spirits and laughing with peers.

## 2020-07-06 NOTE — PLAN OF CARE
Pt took a 2 hr nap before supper. Came out into the milieu to eat dinner. Pt has a bright full range affect. Hygiene is good. Pt appears calm and is approachable. Watched a movie with peers after dinner and interacted socially. No behavior issues. Addendum: Pt stated last night he had the best night's sleep he's had since he's been here. Pt had taken hydroxyzine 50 mg for sleep last night and asked for that again tonight. Not using the melatonin. Pt states appetite has been good. Denies any concerns. Anxious to discharge on Tuesday or Friday, he said,  and feels good about going to outpatient CD treatment. Pt talked to his mother on the phone and said she had a disagreement with his sister which brought his mood down a bit but everything had been resolved. It did not involve him but he was disappointed in his sister. Denies SI, SIB, hallucinations, depression, anxiety or thoughts to hurt others.

## 2020-07-06 NOTE — PLAN OF CARE
Patient evaluation continues. Assessed mood, anxiety, thoughts and behavior   Patient visible and social in the milieu.  Bright, friendly, smiling.  Attended and participated in group.  Denies SI/SIB or thoughts to hurt others.  Denies depression, anxiety.  Thinking is linear and organized.  Denies concerns about his sleep, appetite, medication side effects, concentration.    States he is ready for his court assessment tomorrow.  Feels hopeful.

## 2020-07-07 PROCEDURE — H2032 ACTIVITY THERAPY, PER 15 MIN: HCPCS

## 2020-07-07 PROCEDURE — G0177 OPPS/PHP; TRAIN & EDUC SERV: HCPCS

## 2020-07-07 PROCEDURE — 99232 SBSQ HOSP IP/OBS MODERATE 35: CPT | Mod: GT | Performed by: PSYCHIATRY & NEUROLOGY

## 2020-07-07 PROCEDURE — 90832 PSYTX W PT 30 MINUTES: CPT

## 2020-07-07 PROCEDURE — 12400001 ZZH R&B MH UMMC

## 2020-07-07 PROCEDURE — 25000132 ZZH RX MED GY IP 250 OP 250 PS 637: Performed by: CLINICAL NURSE SPECIALIST

## 2020-07-07 RX ADMIN — NICOTINE POLACRILEX 4 MG: 2 GUM, CHEWING BUCCAL at 18:54

## 2020-07-07 RX ADMIN — NICOTINE POLACRILEX 4 MG: 2 GUM, CHEWING BUCCAL at 20:38

## 2020-07-07 RX ADMIN — HYDROXYZINE HYDROCHLORIDE 50 MG: 25 TABLET, FILM COATED ORAL at 20:38

## 2020-07-07 RX ADMIN — NICOTINE POLACRILEX 4 MG: 2 GUM, CHEWING BUCCAL at 09:58

## 2020-07-07 RX ADMIN — BUPRENORPHINE HCL 4 MG: 2 TABLET SUBLINGUAL at 09:07

## 2020-07-07 RX ADMIN — ESCITALOPRAM OXALATE 10 MG: 10 TABLET ORAL at 09:07

## 2020-07-07 ASSESSMENT — ACTIVITIES OF DAILY LIVING (ADL)
DRESS: INDEPENDENT
DRESS: INDEPENDENT;SCRUBS (BEHAVIORAL HEALTH)
HYGIENE/GROOMING: INDEPENDENT
ORAL_HYGIENE: INDEPENDENT
HYGIENE/GROOMING: INDEPENDENT
ORAL_HYGIENE: INDEPENDENT

## 2020-07-07 ASSESSMENT — MIFFLIN-ST. JEOR: SCORE: 1581.39
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
## 2020-07-07 NOTE — PROGRESS NOTES
07/07/20 1200   Art Therapy   Type of Intervention 1:1   Response participates with encouragement   Hours .5   Treatment Detail    Art Therapy    Objective- Patients use art media, the creative process & resulting artwork to:explore feelings,reconcile emotions, gain self-awareness/ self esteem, manage behaviors, develop social skills, improve reality orientation, & reduce anxiety /depression.     Outcome- pt was cooperative, pleasant and engaged.  Writer and pt agreed to meet to discuss his art from group a couple of days ago as he was deep in thought when he was making it and he didn't want to speak about it then, but did want to share with writer. 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 He was appreciative of the conversation. His grief is very fresh in losing two brothers and he is trying to work through it in a healthy way.

## 2020-07-07 NOTE — PROGRESS NOTES
Work Completed: Team meeting, Chart review, Spoke with court to provide information and Scheduled IOP appointments with [facility]. Chance informed me that the will need to speak with the pt before they can schedule an appointment with a med. Management provider that can prescribe suboxone. Writer called the unit and informed staff and the pt that pt would be receiving a phone call and why.    Discharge plan or goal: discharge home with IOP                 Barriers to discharge: Commitment paperwork.

## 2020-07-07 NOTE — PLAN OF CARE
"Problem: OT General Care Plan  Goal: OT Goal 1  Description: Pt will practice using >2 coping strategies to manage stress and reduce symptoms to demonstrate increased readiness for discharge.     Pt actively participated in a structured occupational therapy group with a focus on self-reflection via journal prompts. Pt was offered journal pages with topics including planning, reflecting, positivity/optimism, learning, productivity, social supports, goals for the future, laughter, and asking for help. Pt constructed a personal journal using various materials. Pt appeared to select positive, encouraging words for the cover of his journal. Pt demonstrated good planning and task organization. Pt focused on the task for the full duration of group. Social with others throughout duration of group. Bright affect. Pt reports court went well when he returned to group.     OT Self-Assessment  Pt was given and completed a written self-assessment form. OT staff reviewed with pt and explained the value of having them involved in their treatment plan, and provided options to meet current needs/self-identified goals.     Pt identified \"my older brother passed away/substance abuse\" as stressors/events that led to hospitalization.    Pt identified the following symptoms that they are currently dealing with:   Emotions: sadness, anxiety/fear, anger/resentment, feeling numb/depressed, rage  Thoughts: negative thoughts, trouble concentrating, trouble making decisions, nightmares  Behaviors: withdrawal, problems in relationships, increased smoking/alcohol/drug use, behaviors that you feel compelled to do    Self-identified coping skills: \"smoking a cigarette, exercise, sleep, watching tv\"  Self-identified social supports: \"family, friends, \"  Self-identified personal strengths: \"I'm a very hard worker once I commit to something I finish it all the way\"    Goals: Manage anxiety/anger, identify/express my feelings in a better " way

## 2020-07-07 NOTE — PROGRESS NOTES
Pt was visible and active in the milieu the majority of the evening. Pt spent time watching a movie and socializing with peers. Pt then spent the end of the evening reading in his room before going to bed. Pt states that he hopes his court appointment goes well tomorrow and that he is hopeful that he will be able to discharge sometime this week. Pt denies experiencing any depression and anxiety. Pt denies SI and SIB.        07/06/20 2200   Behavioral Health   Hallucinations denies / not responding to hallucinations   Thinking intact   Orientation person: oriented;place: oriented;date: oriented;time: oriented   Memory baseline memory   Insight insight appropriate to situation   Judgement intact   Eye Contact at examiner   Affect full range affect   Mood mood is calm   Physical Appearance/Attire attire appropriate to age and situation   1. Wish to be Dead (Recent) No   2. Non-Specific Active Suicidal Thoughts (Recent) No   Self Injury other (see comment)  (denies)   Activity other (see comment)  (active in the milieu)   Speech clear;coherent   Psychomotor / Gait balanced;steady   Activities of Daily Living   Hygiene/Grooming independent   Oral Hygiene independent   Dress independent   Room Organization independent

## 2020-07-07 NOTE — PLAN OF CARE
"  Problem: Suicidal Behavior  Goal: Suicidal Behavior is Absent or Managed  Outcome: Improving     Pt visible and present in milieu, affect full range, brighter with engagement, groomed, calm appearance, reports mood as \"better, less anxious\" Pt using nicotine gum for managing nicotine cravings and did review and sign his paperwork that his  dropped off for patient.  Pt appetite and sleep are improving; denies any SI/SIB, no physical pain.  /85 (BP Location: Right arm)   Pulse 82   Temp 97.4  F (36.3  C) (Oral)   Resp 16   Ht 1.803 m (5' 11\")   Wt 56.9 kg (125 lb 8 oz)   SpO2 99%   BMI 17.50 kg/m    Review of medications-pt denies any side effects or concerns with medications.  Cooperative with staff and interventions; accepting of information and direction from staff and attending group programming; no aggressive or other behavior concerns noted.  "

## 2020-07-07 NOTE — PROGRESS NOTES
Writer receives call from [facility] requesting to check in with patient's /[facility] regarding patient status prior to meeting with patient today. Message sent to [facility] with name/# Van [phone].

## 2020-07-07 NOTE — PROGRESS NOTES
INITIAL OT ASSESSMENT     06/29/20 1500   General Information   Date Initially Attended OT 07/01/20   Has Not Attended OT as of: 06/29/20   Clinical Impression   Affect Appropriate to situation   Orientation Oriented to person, place and time   Appearance and ADLs General cleanliness observed in most areas   Attention to Internal Stimuli No observed signs   Interaction Skills Interacts appropriately with staff;Interacts appropriately with peers   Ability to Communicate Needs Does so with prompts   Verbal Content Articulate;Clear;Appropriate to topic   Ability to Maintain Boundaries Maintains appropriate physical boundaries;Maintains appropriate verbal boundaries   Participation Initiates participation   Concentration Concentrates 30+ minutes   Ability to Concentrate With structure   Follows and Comprehends Directions Independently follows multi-step directions   Memory Delayed and immediate recall intact   Organization Independently organizes all tasks   Decision Making Independent   Planning and Problem Solving Occasionally needs assist/feedback   Ability to Apply and Learn Concepts Comprehends concepts, but needs assist to apply   Frustrations / Stress Tolerance Independently identifies sources of frustration/stress   Level of Insight Insightful into needs, issues, goals;Some insight   Self Esteem Accepts positive feedback;Takes risks with support and encouragement;Can identify positives   Social Supports Has knowledge of support systems

## 2020-07-08 VITALS
WEIGHT: 125.5 LBS | HEART RATE: 73 BPM | RESPIRATION RATE: 16 BRPM | SYSTOLIC BLOOD PRESSURE: 120 MMHG | TEMPERATURE: 97.6 F | HEIGHT: 71 IN | OXYGEN SATURATION: 100 % | DIASTOLIC BLOOD PRESSURE: 82 MMHG | BODY MASS INDEX: 17.57 KG/M2

## 2020-07-08 PROCEDURE — 25000132 ZZH RX MED GY IP 250 OP 250 PS 637: Performed by: CLINICAL NURSE SPECIALIST

## 2020-07-08 PROCEDURE — 99239 HOSP IP/OBS DSCHRG MGMT >30: CPT | Mod: GT | Performed by: PSYCHIATRY & NEUROLOGY

## 2020-07-08 RX ORDER — HYDROXYZINE HYDROCHLORIDE 25 MG/1
25-50 TABLET, FILM COATED ORAL EVERY 4 HOURS PRN
Qty: 60 TABLET | Refills: 1 | Status: SHIPPED | OUTPATIENT
Start: 2020-07-08

## 2020-07-08 RX ORDER — ESCITALOPRAM OXALATE 10 MG/1
10 TABLET ORAL DAILY
Qty: 30 TABLET | Refills: 1 | Status: SHIPPED | OUTPATIENT
Start: 2020-07-09

## 2020-07-08 RX ORDER — BUPRENORPHINE AND NALOXONE 4; 1 MG/1; MG/1
1 FILM, SOLUBLE BUCCAL; SUBLINGUAL DAILY
Qty: 7 FILM | Refills: 0 | Status: SHIPPED | OUTPATIENT
Start: 2020-07-08 | End: 2020-07-09

## 2020-07-08 RX ORDER — LANOLIN ALCOHOL/MO/W.PET/CERES
3 CREAM (GRAM) TOPICAL AT BEDTIME
Qty: 3 TABLET
Start: 2020-07-08

## 2020-07-08 RX ADMIN — BUPRENORPHINE HCL 4 MG: 2 TABLET SUBLINGUAL at 09:05

## 2020-07-08 RX ADMIN — ESCITALOPRAM OXALATE 10 MG: 10 TABLET ORAL at 09:05

## 2020-07-08 RX ADMIN — NICOTINE POLACRILEX 4 MG: 2 GUM, CHEWING BUCCAL at 13:10

## 2020-07-08 ASSESSMENT — ACTIVITIES OF DAILY LIVING (ADL)
HYGIENE/GROOMING: INDEPENDENT
ORAL_HYGIENE: INDEPENDENT
DRESS: SCRUBS (BEHAVIORAL HEALTH);INDEPENDENT

## 2020-07-08 NOTE — DISCHARGE INSTRUCTIONS
Behavioral Discharge Planning and Instructions      Summary: You were admitted on 6/26/2020 to Station [address] [place] [address] due to suicidal ideation. You were treated by Debra Naegele, APRN, CNS and discharged on 07/08/2020 to Home    Principal Diagnosis:   Major depressive disorder, first episode, severe without psychosis.   Intentional overdose with Xanax and opiates.   Opiate use disorder, severe.   Rule out benzodiazepine use disorder.   Cannabis use disoder    Health Care Follow-up Appointments:   Medication Management  Date: 07/28/2020  Time: 2:00pm   (You will need to go to this location for this appointment.)    Provider: Delia Strong  Address: [facility] [address] [place] [address] [address], [place] [address]  Phone: [phone]   [facility] has faxed the AVS to this provider at Fax: [phone]      Medication Management  Date: 08/04/2020  Time: 2:00pm   (This appointment will be done over the phone)    Provider: Delia Strong  Address: [facility] [address] [place] [address] [address], [place] [address]  Phone: [phone]   [facility] has faxed the AVS to this provider at Fax: [phone]      Medication Management  Date: 08/11/2020  Time: 2:00pm   (This appointment will be done over the phone)    Provider: Delia Strong  Address: [facility] [address], [place] [address]  Phone: [phone]   [facility] has faxed the AVS to this provider at Fax: [phone]      Medication Management  Date: 08/18/2020  Time: 2:00pm   (This appointment will be done over the phone)    Provider: Delia Strong  Address: [facility] [address] [place] [address] [address], [place] [address]  Phone: [phone]   [facility] has faxed the AVS to this provider at Fax: [phone]        Outpatient Treatment Intake Appointment   Date: 07/14/2020  Time: 12:00pm     (This appointment will be a phone appointment)  Provider: Kobi Mazariegos  Address: [facility] [address], [place] [address]  Phone: [phone]    [facility] has faxed the AVS to this provider at Fax: [phone]     Part of [facility] program is to speak with a nutritionist at least once that appointment will be:    Date: 07/17/2020  Time: 3:00pm  (This appointment will be a phone appointment)     Provider: Dr. Nguyễn   Address: [facility] [address], [place] [address]  Phone: [phone]   [facility] has faxed the AVS to this provider at Fax: [phone]     Attend all scheduled appointments with your outpatient providers. Call at least 24 hours in advance if you need to reschedule an appointment to ensure continued access to your outpatient providers.   Major Treatments, Procedures and Findings: You were provided with: a psychiatric assessment, assessed for medical stability, medication evaluation and/or management, group therapy, art therapy, milieu management, medical interventions and skills/OT groups.    Symptoms to Report: If you experience any of the following symptoms please report them right away to your provider or to family/friends; feeling more aggressive, increased confusion, losing more sleep, mood getting worse or thoughts of suicide.    Early warning signs can include: Early warning signs that could signal a potential relapse could include but not limited to the following; increased depression or anxiety sleep disturbances increased thoughts or behaviors of suicide or self-harm increased unusual thinking, such as paranoia or hearing voices.     Safety and Wellness:  Take all medicines as directed.  Make no changes unless your doctor suggests them. Follow treatment recommendations.  Refrain from alcohol and non-prescribed drugs. If there is a concern for safety, call 911.    Resources: Mental health crisis response for your [facility] is offered 24 hours a day, 7 days a week. A trained counselor will assess your current situation, offer support and counseling and connect you with local resources. Please call   "[facility] Crisis (COPE) Response - Adult [phone]    Crisis Intervention: 253.704.1544 or 595-326-7354 (TTY: 904.300.3473).  Call anytime for help.  National [place] on Mental Illness (www.[place].shorty.org): 152.167.2297 or 593-509-0710.  Suicide Awareness Voices of Education (SAVE) (www.save.org): 130-249-XOIW (7680)  National Suicide Prevention Line (www.mentalhealthmn.org): 888-308-LJYR (1257)  Mental Health Consumer/Survivor Network of [place] (www.mhcsn.net): 677.483.2075 or 706-673-6370  Mental Health Association of [place] (www.mentalhealth.org): 433.110.3283 or 765-358-0649  Self- Management and Recovery Training., SMART-- Toll free: 287.717.8092  www.[employer]Process[employer]  Text 4 Life: txt \"LIFE\" to 27957 for immediate support and crisis intervention  Crisis text line: Text \"[place]\" to 651877. Free, confidential, 24/7.    The treatment team has appreciated the opportunity to work with you. Juan C, please take care and make your recovery a daily recovery. If you have any questions or concerns our unit number is [phone].  You will be receiving a follow-up phone call within the next three days from a representative from behavioral health.  You have identified the best phone number to reach you as [phone] (home)       "

## 2020-07-08 NOTE — PROGRESS NOTES
07/07/20 1900   Behavioral Health   Hallucinations denies / not responding to hallucinations   Thinking intact   Orientation person: oriented;place: oriented;date: oriented;time: oriented   Memory baseline memory   Insight admits / accepts   Judgement intact   Eye Contact at examiner   Affect full range affect   Mood mood is calm   Physical Appearance/Attire attire appropriate to age and situation   Hygiene well groomed   Suicidality other (see comments)  (denies)   1. Wish to be Dead (Recent) No   2. Non-Specific Active Suicidal Thoughts (Recent) No   Self Injury other (see comment)  (denies)   Elopement   (no concerns)   Activity other (see comment)  (pt is social and visible in milieu)   Speech clear;coherent   Medication Sensitivity no stated side effects;no observed side effects   Psychomotor / Gait balanced;steady   Activities of Daily Living   Hygiene/Grooming independent   Oral Hygiene independent   Dress independent   Room Organization independent     Pt denies all concerns. Pt was an active participant in group. Pt was jovial with staff and peers. Pt displayed full range of affect. Pt was social in the lounge. Pt mood is calm.

## 2020-07-08 NOTE — PLAN OF CARE
"  Problem: Suicidal Behavior  Goal: Suicidal Behavior is Absent or Managed  Outcome: improving    Pt visible, calm, groomed appearance, brighter affect with engagement, reports mood as \"better, little tired\" denies any significant anxiety, physical pain or disturbing thoughts (no SI/SIB) appetite good, denies any med concerns; no cravings. Pt napping after meeting with the provider; Cooperative and no aggression or other behavior concerns; Discharge placement in process, will assist patient as needed.     "

## 2020-07-08 NOTE — PROGRESS NOTES
Patient discharging 7/8/2020 accompanied by Mother and destination is home. Patient calls Mother who plans to  patient before 3 pm today.    Discharge paperwork and medications reviewed with patient who verbalizes understanding. Discussed importance of identifying triggers and minimizing/avoiding along with decreasing any stress if having any warning signs. Patient agrees to contact his providers at [facility] if he has any symptoms that continue despite minimizing stressors and utilizing coping skills.    Patient admits to feeling nervous, but denies any SI/SIB or significant anxiety; pt reminded to utilize healthy coping skills and wellness strategies-worksheets completed in his folder. All questions answered.    Copies provided: AVS -yes          Illness Management Recovery model: Personal Plan of Care    Patient completed Personal Plan of Care, identifying reasons for hospitalization and goals for discharge. Form reviewed in team meeting  by patient, physician, writer and RN. Form given to [facility] to be scanned into EPIC.    Survey provided.
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
## 2020-07-08 NOTE — PROGRESS NOTES
07/07/20 2200   Therapeutic Recreation   Type of Intervention structured groups   Activity game   Response Participates, initiates socially appropriate   Hours 1     Pt participated in Therapeutic Recreation group with focus on leisure participation, social engagement, and critical thinking. Engaged and focused in the group recreational activity via a group game.  Pt was a full participant throughout the entire duration of group.  Appropriately shared sense of humor with peers during group and appeared to brighten with social interaction. Pt was very sociable and often had friendly banter with peers throughout the activity.

## 2020-07-09 RX ORDER — BUPRENORPHINE AND NALOXONE 4; 1 MG/1; MG/1
1 FILM, SOLUBLE BUCCAL; SUBLINGUAL DAILY
Qty: 14 FILM | Refills: 0 | Status: SHIPPED | OUTPATIENT
Start: 2020-07-14

## 2024-12-17 NOTE — PLAN OF CARE
Patient evaluation continues. Assessed mood, anxiety, thoughts and behavior     Patient up and visible in the milieu.  Full range.  Very pleasant.  Denies all mental health concerns.  Denies SI/SIB or thoughts to hurt others.  Denies hallucinations--thinking is linear and organized.  Attending group.  Reports good sleep last night.  Denies concerns regarding appetite, medication side effects, concentration.   Chronic atrial fibrillation